# Patient Record
Sex: FEMALE | Race: WHITE | Employment: UNEMPLOYED | URBAN - METROPOLITAN AREA
[De-identification: names, ages, dates, MRNs, and addresses within clinical notes are randomized per-mention and may not be internally consistent; named-entity substitution may affect disease eponyms.]

---

## 2017-10-28 RX ORDER — SERTRALINE HYDROCHLORIDE 100 MG/1
TABLET, FILM COATED ORAL
Qty: 30 TABLET | Refills: 10 | Status: SHIPPED | OUTPATIENT
Start: 2017-10-28 | End: 2018-10-13 | Stop reason: SDUPTHER

## 2017-10-31 RX ORDER — ACETAMINOPHEN AND CODEINE PHOSPHATE 120; 12 MG/5ML; MG/5ML
SOLUTION ORAL
Qty: 28 TABLET | Refills: 10 | Status: SHIPPED | OUTPATIENT
Start: 2017-10-31 | End: 2018-09-18 | Stop reason: ALTCHOICE

## 2018-01-03 ENCOUNTER — TELEPHONE (OUTPATIENT)
Dept: OBGYN CLINIC | Age: 38
End: 2018-01-03

## 2018-01-03 RX ORDER — NORETHINDRONE ACETATE AND ETHINYL ESTRADIOL 1MG-20(21)
1 KIT ORAL DAILY
Qty: 1 PACKET | Refills: 11 | Status: SHIPPED | OUTPATIENT
Start: 2018-01-03 | End: 2018-12-20

## 2018-01-03 NOTE — TELEPHONE ENCOUNTER
Patient has completely stopped nursing twins 2--3 months ago so is now having breakthrough on Micronor. Would like to change to regular strength OCP.        Babita Barragan

## 2018-08-14 ENCOUNTER — TELEPHONE (OUTPATIENT)
Dept: FAMILY MEDICINE CLINIC | Age: 38
End: 2018-08-14
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
## 2018-09-18 ENCOUNTER — OFFICE VISIT (OUTPATIENT)
Dept: FAMILY MEDICINE CLINIC | Age: 38
End: 2018-09-18
Payer: COMMERCIAL

## 2018-09-18 VITALS
SYSTOLIC BLOOD PRESSURE: 118 MMHG | BODY MASS INDEX: 28.32 KG/M2 | HEIGHT: 61 IN | HEART RATE: 87 BPM | OXYGEN SATURATION: 98 % | WEIGHT: 150 LBS | DIASTOLIC BLOOD PRESSURE: 74 MMHG

## 2018-09-18 DIAGNOSIS — Z51.81 MEDICATION MONITORING ENCOUNTER: ICD-10-CM

## 2018-09-18 DIAGNOSIS — Z63.72 ALCOHOLIC SPOUSE: ICD-10-CM

## 2018-09-18 DIAGNOSIS — R53.83 FATIGUE, UNSPECIFIED TYPE: ICD-10-CM

## 2018-09-18 DIAGNOSIS — Z11.3 SCREEN FOR STD (SEXUALLY TRANSMITTED DISEASE): ICD-10-CM

## 2018-09-18 DIAGNOSIS — Z13.220 SCREENING FOR LIPID DISORDERS: ICD-10-CM

## 2018-09-18 DIAGNOSIS — F41.8 SITUATIONAL ANXIETY: Primary | ICD-10-CM

## 2018-09-18 PROCEDURE — 99204 OFFICE O/P NEW MOD 45 MIN: CPT | Performed by: FAMILY MEDICINE

## 2018-09-18 RX ORDER — ETONOGESTREL AND ETHINYL ESTRADIOL 11.7; 2.7 MG/1; MG/1
1 INSERT, EXTENDED RELEASE VAGINAL
Qty: 3 EACH | Refills: 3 | Status: SHIPPED | OUTPATIENT
Start: 2018-09-18 | End: 2019-01-22 | Stop reason: ALTCHOICE

## 2018-09-18 ASSESSMENT — ENCOUNTER SYMPTOMS
EYE PAIN: 0
BLOOD IN STOOL: 0
SINUS PRESSURE: 0
WHEEZING: 0
ABDOMINAL PAIN: 0
VOMITING: 0
PHOTOPHOBIA: 0
CHEST TIGHTNESS: 0
SORE THROAT: 0
DIARRHEA: 0
FACIAL SWELLING: 0
ABDOMINAL DISTENTION: 0
COLOR CHANGE: 0
NAUSEA: 0
EYE REDNESS: 0
SHORTNESS OF BREATH: 0
CONSTIPATION: 0
EYE ITCHING: 0
VOICE CHANGE: 0
COUGH: 0
EYE DISCHARGE: 0
RHINORRHEA: 0
BACK PAIN: 0

## 2018-09-18 ASSESSMENT — PATIENT HEALTH QUESTIONNAIRE - PHQ9
2. FEELING DOWN, DEPRESSED OR HOPELESS: 0
SUM OF ALL RESPONSES TO PHQ QUESTIONS 1-9: 1
SUM OF ALL RESPONSES TO PHQ QUESTIONS 1-9: 1
1. LITTLE INTEREST OR PLEASURE IN DOING THINGS: 1
SUM OF ALL RESPONSES TO PHQ9 QUESTIONS 1 & 2: 1
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
## 2018-12-20 ENCOUNTER — HOSPITAL ENCOUNTER (OUTPATIENT)
Age: 38
Setting detail: SPECIMEN
Discharge: HOME | End: 2018-12-20
Payer: COMMERCIAL

## 2018-12-20 ENCOUNTER — OFFICE VISIT (OUTPATIENT)
Dept: OBGYN CLINIC | Age: 38
End: 2018-12-20
Payer: COMMERCIAL

## 2018-12-20 VITALS — BODY MASS INDEX: 29.66 KG/M2 | SYSTOLIC BLOOD PRESSURE: 128 MMHG | DIASTOLIC BLOOD PRESSURE: 72 MMHG | WEIGHT: 157 LBS

## 2018-12-20 DIAGNOSIS — N89.8 VAGINAL DISCHARGE: ICD-10-CM

## 2018-12-20 DIAGNOSIS — Z30.09 ENCOUNTER FOR COUNSELING REGARDING CONTRACEPTION: ICD-10-CM

## 2018-12-20 DIAGNOSIS — N89.8 VAGINAL DISCHARGE: Primary | ICD-10-CM

## 2018-12-20 LAB
DIRECT EXAM: ABNORMAL
Lab: ABNORMAL
SPECIMEN DESCRIPTION: ABNORMAL
STATUS: ABNORMAL

## 2018-12-20 PROCEDURE — 99213 OFFICE O/P EST LOW 20 MIN: CPT | Performed by: NURSE PRACTITIONER

## 2018-12-20 RX ORDER — NORETHINDRONE ACETATE AND ETHINYL ESTRADIOL 1MG-20(21)
1 KIT ORAL DAILY
Qty: 1 PACKET | Refills: 1 | Status: SHIPPED | OUTPATIENT
Start: 2018-12-20 | End: 2019-01-22 | Stop reason: SDUPTHER

## 2018-12-20 ASSESSMENT — ENCOUNTER SYMPTOMS
NAUSEA: 0
ABDOMINAL PAIN: 0
VOMITING: 0
SHORTNESS OF BREATH: 0
WHEEZING: 0

## 2018-12-20 NOTE — PATIENT INSTRUCTIONS
Patient Education        Vaginitis: Care Instructions  Your Care Instructions    Vaginitis is soreness or infection of the vagina. This common problem can cause itching and burning. And it can cause a change in vaginal discharge. Sometimes it can cause pain during sex. Vaginitis may be caused by bacteria, yeast, or other germs. Some infections that cause it are caught from a sexual partner. Bath products, spermicides, and douches can irritate the vagina too. Some women have this problem during and after menopause. A drop in estrogen levels during this time can cause dryness, soreness, and pain during sex. Your doctor can give you medicine to treat an infection. And home care may help you feel better. For certain types of infections, your sex partner must be treated too. Follow-up care is a key part of your treatment and safety. Be sure to make and go to all appointments, and call your doctor if you are having problems. It's also a good idea to know your test results and keep a list of the medicines you take. How can you care for yourself at home? · If your doctor prescribed antibiotics, take them as directed. Do not stop taking them just because you feel better. You need to take the full course of antibiotics. · Take your medicines exactly as prescribed. Call your doctor if you think you are having a problem with your medicine. · Do not eat or drink anything that has alcohol if you are taking metronidazole (Flagyl). · If you have a yeast infection, use over-the-counter products as your doctor tells you to. Or take medicine your doctor prescribes exactly as directed. · Wash your vaginal area daily with water. You also can use a mild, unscented soap if you want. · Do not use scented bath products. And do not use vaginal sprays or douches. · Put a washcloth soaked in cool water on the area to relieve itching. Or you can take cool baths.   · If you have dryness because of menopause, use estrogen cream or pills STI, use a condom to protect against disease. When should you call for help? Call your doctor now or seek immediate medical care if:    · You have pain in your belly or pelvis.     · You have severe vaginal bleeding. This means that you are soaking through your usual pads or tampons each hour for 2 or more hours.     · You have vaginal discharge that smells bad.     · You have a fever.    Watch closely for changes in your health, and be sure to contact your doctor if you have any problems. Where can you learn more? Go to https://chlisa.health"[employer]. org and sign in to your [employer] account. Enter U282 in the youwho box to learn more about \"Intrauterine Device (IUD) for Birth Control: Care Instructions. \"     If you do not have an account, please click on the \"Sign Up Now\" link. Current as of: November 21, 2017  Content Version: 11.8  © 6089-8970 [employer]. Care instructions adapted under license by [facility] ([facility]). If you have questions about a medical condition or this instruction, always ask your healthcare professional. [place] [address] any warranty or liability for your use of this information. Combination Birth Control Pills: Care Instructions  Your Care Instructions    Combination birth control pills are used to prevent pregnancy. They give you a regular dose of the hormones estrogen and progestin. You take a hormone pill every day to prevent pregnancy. Birth control pills come in packs. The most common type has 3 weeks of hormone pills. Some packs have sugar pills (they do not contain any hormones) for the fourth week. During that fourth no-hormone week, you have your period. After the fourth week (28 days), you start a new pack. Some birth control pills are packaged in different ways. For example, some have hormone pills for the fourth week instead of sugar pills.  Taking hormones for the entire month causes you to not have periods or to

## 2018-12-21 ENCOUNTER — TELEPHONE (OUTPATIENT)
Dept: OBGYN CLINIC | Age: 38
End: 2018-12-21

## 2018-12-21 DIAGNOSIS — B96.89 BACTERIAL VAGINOSIS: Primary | ICD-10-CM

## 2018-12-21 DIAGNOSIS — N76.0 BACTERIAL VAGINOSIS: Primary | ICD-10-CM

## 2018-12-21 LAB
C TRACH DNA GENITAL QL NAA+PROBE: NEGATIVE
N. GONORRHOEAE DNA: NEGATIVE

## 2018-12-21 RX ORDER — METRONIDAZOLE 500 MG/1
500 TABLET ORAL 2 TIMES DAILY
Qty: 14 TABLET | Refills: 0 | Status: SHIPPED | OUTPATIENT
Start: 2018-12-21 | End: 2018-12-28

## 2019-01-22 ENCOUNTER — HOSPITAL ENCOUNTER (OUTPATIENT)
Age: 39
Setting detail: SPECIMEN
Discharge: HOME | End: 2019-01-22
Payer: COMMERCIAL

## 2019-01-22 ENCOUNTER — OFFICE VISIT (OUTPATIENT)
Dept: OBGYN CLINIC | Age: 39
End: 2019-01-22
Payer: COMMERCIAL

## 2019-01-22 VITALS — BODY MASS INDEX: 29.66 KG/M2 | HEIGHT: 61 IN | SYSTOLIC BLOOD PRESSURE: 128 MMHG | DIASTOLIC BLOOD PRESSURE: 60 MMHG

## 2019-01-22 DIAGNOSIS — Z01.419 ENCOUNTER FOR ANNUAL ROUTINE GYNECOLOGICAL EXAMINATION: Primary | ICD-10-CM

## 2019-01-22 PROCEDURE — 99395 PREV VISIT EST AGE 18-39: CPT | Performed by: NURSE PRACTITIONER

## 2019-01-22 RX ORDER — NORETHINDRONE ACETATE AND ETHINYL ESTRADIOL 1MG-20(21)
1 KIT ORAL DAILY
Qty: 1 PACKET | Refills: 11 | Status: SHIPPED | OUTPATIENT
Start: 2019-01-22 | End: 2020-01-06

## 2019-01-22 ASSESSMENT — ENCOUNTER SYMPTOMS
NAUSEA: 0
ABDOMINAL PAIN: 0
COUGH: 0
CONSTIPATION: 0
SHORTNESS OF BREATH: 0
RHINORRHEA: 0
BACK PAIN: 0
DIARRHEA: 0
VOMITING: 0

## 2019-01-25 LAB
HPV SAMPLE: ABNORMAL
HPV SOURCE: ABNORMAL
HPV, GENOTYPE 16: NOT DETECTED
HPV, GENOTYPE 18: NOT DETECTED
HPV, HIGH RISK OTHER: DETECTED
HPV, INTERPRETATION: ABNORMAL

## 2019-02-06 PROBLEM — R87.610 ASCUS WITH POSITIVE HIGH RISK HPV CERVICAL: Status: ACTIVE | Noted: 2019-02-06

## 2019-02-06 PROBLEM — R87.810 ASCUS WITH POSITIVE HIGH RISK HPV CERVICAL: Status: ACTIVE | Noted: 2019-02-06

## 2019-02-06 LAB — CYTOLOGY REPORT: NORMAL

## 2019-03-04 ENCOUNTER — PROCEDURE VISIT (OUTPATIENT)
Dept: OBGYN CLINIC | Age: 39
End: 2019-03-04
Payer: COMMERCIAL

## 2019-03-04 ENCOUNTER — HOSPITAL ENCOUNTER (OUTPATIENT)
Age: 39
Setting detail: SPECIMEN
Discharge: HOME | End: 2019-03-04
Payer: COMMERCIAL

## 2019-03-04 VITALS — SYSTOLIC BLOOD PRESSURE: 120 MMHG | HEIGHT: 61 IN | DIASTOLIC BLOOD PRESSURE: 60 MMHG | BODY MASS INDEX: 29.66 KG/M2

## 2019-03-04 DIAGNOSIS — N89.8 VAGINAL DISCHARGE: ICD-10-CM

## 2019-03-04 DIAGNOSIS — R87.610 ASCUS WITH POSITIVE HIGH RISK HPV CERVICAL: Primary | ICD-10-CM

## 2019-03-04 DIAGNOSIS — R87.810 ASCUS WITH POSITIVE HIGH RISK HPV CERVICAL: Primary | ICD-10-CM

## 2019-03-04 DIAGNOSIS — Z01.812 PRE-PROCEDURE LAB EXAM: ICD-10-CM

## 2019-03-04 LAB
CONTROL: PRESENT
PREGNANCY TEST URINE, POC: NEGATIVE

## 2019-03-04 PROCEDURE — 57454 BX/CURETT OF CERVIX W/SCOPE: CPT | Performed by: OBSTETRICS & GYNECOLOGY

## 2019-03-04 PROCEDURE — 81025 URINE PREGNANCY TEST: CPT | Performed by: OBSTETRICS & GYNECOLOGY

## 2019-03-05 DIAGNOSIS — N89.8 VAGINAL DISCHARGE: ICD-10-CM

## 2019-03-05 LAB
DIRECT EXAM: NORMAL
Lab: NORMAL
SPECIMEN DESCRIPTION: NORMAL

## 2019-03-06 LAB
C TRACH DNA GENITAL QL NAA+PROBE: NEGATIVE
N. GONORRHOEAE DNA: NEGATIVE
SPECIMEN DESCRIPTION: NORMAL

## 2019-03-08 LAB — SURGICAL PATHOLOGY REPORT: NORMAL

## 2019-03-11 PROBLEM — N87.0 DYSPLASIA OF CERVIX, LOW GRADE (CIN 1): Status: ACTIVE | Noted: 2019-03-11

## 2019-03-12 ENCOUNTER — TELEPHONE (OUTPATIENT)
Dept: OBGYN CLINIC | Age: 39
End: 2019-03-12

## 2019-03-27 ENCOUNTER — HOSPITAL ENCOUNTER (EMERGENCY)
Age: 39
Discharge: PSYCHIATRIC OR SUBSTANCE USE FACILITY | End: 2019-03-28
Attending: EMERGENCY MEDICINE
Payer: COMMERCIAL

## 2019-03-27 VITALS
OXYGEN SATURATION: 98 % | TEMPERATURE: 97.3 F | RESPIRATION RATE: 16 BRPM | HEART RATE: 87 BPM | DIASTOLIC BLOOD PRESSURE: 81 MMHG | SYSTOLIC BLOOD PRESSURE: 126 MMHG

## 2019-03-27 DIAGNOSIS — R45.851 SUICIDAL IDEATIONS: Primary | ICD-10-CM

## 2019-03-27 DIAGNOSIS — F33.9 EPISODE OF RECURRENT MAJOR DEPRESSIVE DISORDER, UNSPECIFIED DEPRESSION EPISODE SEVERITY (HCC): ICD-10-CM

## 2019-03-27 PROCEDURE — 99285 EMERGENCY DEPT VISIT HI MDM: CPT

## 2019-03-27 RX ORDER — BENZTROPINE MESYLATE 1 MG/ML
2 INJECTION INTRAMUSCULAR; INTRAVENOUS 2 TIMES DAILY PRN
Status: CANCELLED | OUTPATIENT
Start: 2019-03-27

## 2019-03-27 RX ORDER — NICOTINE 21 MG/24HR
1 PATCH, TRANSDERMAL 24 HOURS TRANSDERMAL DAILY
Status: CANCELLED | OUTPATIENT
Start: 2019-03-28

## 2019-03-27 RX ORDER — MAGNESIUM HYDROXIDE/ALUMINUM HYDROXICE/SIMETHICONE 120; 1200; 1200 MG/30ML; MG/30ML; MG/30ML
30 SUSPENSION ORAL EVERY 6 HOURS PRN
Status: CANCELLED | OUTPATIENT
Start: 2019-03-27

## 2019-03-27 RX ORDER — TRAZODONE HYDROCHLORIDE 50 MG/1
50 TABLET ORAL NIGHTLY PRN
Status: CANCELLED | OUTPATIENT
Start: 2019-03-28

## 2019-03-27 RX ORDER — HYDROXYZINE PAMOATE 50 MG/1
25 CAPSULE ORAL 3 TIMES DAILY PRN
Status: CANCELLED | OUTPATIENT
Start: 2019-03-27

## 2019-03-27 RX ORDER — ACETAMINOPHEN 325 MG/1
650 TABLET ORAL EVERY 4 HOURS PRN
Status: CANCELLED | OUTPATIENT
Start: 2019-03-27
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
## 2019-03-28 ENCOUNTER — HOSPITAL ENCOUNTER (INPATIENT)
Age: 39
LOS: 3 days | Discharge: HOME | DRG: 885 | End: 2019-03-31
Attending: PSYCHIATRY & NEUROLOGY | Admitting: PSYCHIATRY & NEUROLOGY
Payer: COMMERCIAL

## 2019-03-28 PROBLEM — R45.851 DEPRESSION WITH SUICIDAL IDEATION: Status: ACTIVE | Noted: 2019-03-28

## 2019-03-28 PROBLEM — F32.A DEPRESSION WITH SUICIDAL IDEATION: Status: ACTIVE | Noted: 2019-03-28

## 2019-03-28 PROBLEM — F33.9 MAJOR DEPRESSION, RECURRENT (HCC): Status: ACTIVE | Noted: 2019-03-28

## 2019-03-28 PROCEDURE — 6370000000 HC RX 637 (ALT 250 FOR IP): Performed by: NURSE PRACTITIONER

## 2019-03-28 PROCEDURE — 90792 PSYCH DIAG EVAL W/MED SRVCS: CPT | Performed by: NURSE PRACTITIONER

## 2019-03-28 PROCEDURE — 1240000000 HC EMOTIONAL WELLNESS R&B

## 2019-03-28 RX ORDER — AMOXICILLIN 500 MG/1
500 CAPSULE ORAL EVERY 12 HOURS SCHEDULED
Status: DISCONTINUED | OUTPATIENT
Start: 2019-03-28 | End: 2019-03-31 | Stop reason: HOSPADM

## 2019-03-28 RX ORDER — TRAZODONE HYDROCHLORIDE 50 MG/1
50 TABLET ORAL NIGHTLY PRN
Status: DISCONTINUED | OUTPATIENT
Start: 2019-03-28 | End: 2019-03-31 | Stop reason: HOSPADM

## 2019-03-28 RX ORDER — BENZTROPINE MESYLATE 1 MG/ML
2 INJECTION INTRAMUSCULAR; INTRAVENOUS 2 TIMES DAILY PRN
Status: DISCONTINUED | OUTPATIENT
Start: 2019-03-28 | End: 2019-03-31 | Stop reason: HOSPADM

## 2019-03-28 RX ORDER — CITALOPRAM 20 MG/1
20 TABLET ORAL DAILY
Status: DISCONTINUED | OUTPATIENT
Start: 2019-03-28 | End: 2019-03-31 | Stop reason: HOSPADM

## 2019-03-28 RX ORDER — NICOTINE 21 MG/24HR
1 PATCH, TRANSDERMAL 24 HOURS TRANSDERMAL DAILY
Status: DISCONTINUED | OUTPATIENT
Start: 2019-03-28 | End: 2019-03-31 | Stop reason: HOSPADM

## 2019-03-28 RX ORDER — MAGNESIUM HYDROXIDE/ALUMINUM HYDROXICE/SIMETHICONE 120; 1200; 1200 MG/30ML; MG/30ML; MG/30ML
30 SUSPENSION ORAL EVERY 6 HOURS PRN
Status: DISCONTINUED | OUTPATIENT
Start: 2019-03-28 | End: 2019-03-31 | Stop reason: HOSPADM

## 2019-03-28 RX ORDER — HYDROXYZINE HYDROCHLORIDE 25 MG/1
25 TABLET, FILM COATED ORAL 3 TIMES DAILY PRN
Status: DISCONTINUED | OUTPATIENT
Start: 2019-03-28 | End: 2019-03-31 | Stop reason: HOSPADM

## 2019-03-28 RX ORDER — ACETAMINOPHEN 325 MG/1
650 TABLET ORAL EVERY 4 HOURS PRN
Status: DISCONTINUED | OUTPATIENT
Start: 2019-03-28 | End: 2019-03-31 | Stop reason: HOSPADM

## 2019-03-28 RX ADMIN — HYDROXYZINE HYDROCHLORIDE 25 MG: 25 TABLET, FILM COATED ORAL at 01:51

## 2019-03-28 RX ADMIN — HYDROXYZINE HYDROCHLORIDE 25 MG: 25 TABLET, FILM COATED ORAL at 13:46

## 2019-03-28 RX ADMIN — TRAZODONE HYDROCHLORIDE 50 MG: 50 TABLET ORAL at 21:36

## 2019-03-28 RX ADMIN — HYDROXYZINE HYDROCHLORIDE 25 MG: 25 TABLET, FILM COATED ORAL at 21:36

## 2019-03-28 RX ADMIN — CITALOPRAM HYDROBROMIDE 20 MG: 20 TABLET ORAL at 12:42

## 2019-03-28 RX ADMIN — AMOXICILLIN 500 MG: 500 CAPSULE ORAL at 21:36

## 2019-03-28 ASSESSMENT — PAIN - FUNCTIONAL ASSESSMENT: PAIN_FUNCTIONAL_ASSESSMENT: 0-10

## 2019-03-28 ASSESSMENT — SLEEP AND FATIGUE QUESTIONNAIRES
DIFFICULTY FALLING ASLEEP: YES
SLEEP PATTERN: DIFFICULTY FALLING ASLEEP;DISTURBED/INTERRUPTED SLEEP;RESTLESSNESS
AVERAGE NUMBER OF SLEEP HOURS: 3
DIFFICULTY ARISING: NO
DO YOU USE A SLEEP AID: NO
DIFFICULTY STAYING ASLEEP: YES
DO YOU HAVE DIFFICULTY SLEEPING: YES
RESTFUL SLEEP: NO

## 2019-03-28 ASSESSMENT — ENCOUNTER SYMPTOMS
EYE PAIN: 0
ALLERGIC/IMMUNOLOGIC NEGATIVE: 1
EYE DISCHARGE: 0
EYE REDNESS: 0

## 2019-03-28 ASSESSMENT — LIFESTYLE VARIABLES
HISTORY_ALCOHOL_USE: NO
HISTORY_ALCOHOL_USE: NO

## 2019-03-28 ASSESSMENT — PATIENT HEALTH QUESTIONNAIRE - PHQ9: SUM OF ALL RESPONSES TO PHQ QUESTIONS 1-9: 27
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
## 2019-03-28 NOTE — GROUP NOTE
Group Therapy Note    Date: March 28    Group Start Time: 1100  Group End Time: 1130  Group Topic: Recreational    ST[facility] LASHELL Gomez, CTRS      Group Therapy Note    Pt did not attend Therapeutic Recreation at 1100 d/t resting in room despite staff invitation to attend.
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
## 2019-03-28 NOTE — GROUP NOTE
Group Therapy Note    Date: March 28    Group Start Time: 1430  Group End Time: 1500  Group Topic: Psychoeducation    [facility]    Emilio Ortiz, CTRS      Group Therapy Note    Pt did not attend Therapeutic Recreation at 1430 d/t resting in room despite staff invitation to attend.

## 2019-03-28 NOTE — ED NOTES
Pt resting in bed, no needs at this time. Will continue to monitor.       Aj Houston RN  03/27/19 8041

## 2019-03-28 NOTE — ED NOTES
[] [place]    [] [address]    [x]  [address] ASSESSMENT      Y  N     [] [x] In the past two weeks have you had thoughts of hurting yourself in any way? Pt denying suicidal thoughts, however family members have shown text messages confirming pt has stated thoughts of SI     [] [x] In the past two weeks have you had thoughts that you would be better off dead? [] [x] Have you made a suicide attempt in the past two months? [] [x] Do you have a plan for hurting yourself or suicide? [] [x] Presence of hallucinations/voices related to hurting himself or herself or someone else. SUICIDE/SECURITY WATCH PRECAUTION CHECKLIST     Orders    [x]  Suicide/Security Watch Precautions initiated as checked below:   3/27/19 9:32 PM BH31/BH31C    [x] Notified physician:  Suzanne Medina MD  3/27/19 9:32 PM    [x] Orders obtained as appropriate:     [x] 1:1 Observer     [] Psych Consult     [] Psych Consult    Name:  Date:  Time:    [x] 1:1 Observer, Notified by:  Cecily Carrasquillo    Contact Nurse Supervisor    [x] Remove all personal clothes from room and place in snap/paper gown/pants. Slipper only    [x] Remove all personal belongings from room and secured away from patient. Documentation    [x] Initiate Suicide/Security Watch Precaution Flow Sheet    [x] Initiate individualized Care Plan/Problem    [x] Document why precautions initiated on flow sheet (Initiate Nursing Care Plan/Problem)    [x] 1:1 Observer in place; instructions provided. Suicide precautions require observer be within arms length. [x] Nurse-Observer Communication Hand-off initiated by RN, reviewed with Observer. Subsequently used as Hand Off between Observers. [x] Initiate every 15 minute observations per observer as delegated by the RN.     [x] Initiate RN assessment and documentation    Environmental Scan  Search Criteria and Process: OPTIONAL, see Search Policy    [] Reason for search:    [] Nursing in presence of second person to search patient    [] Patient notified of reason for body assessment and belongings search:     Persons present during search:   Results of search and disposition:       Searchers Name: Security      These items or items similar should be removed from the room:   [] Chairs   [x] Telephone   [x] Trash cans and liners   [x] Plastic utensils (order Patient Safety tray)   [] Empty or remove Sharps containers   [x] All personal clothing/belongings removed   [x] All unnecessary lead wires, electrical cords, draw cords, etc.   [x] Flowers and plants   [x] Double check for lighters, matches, razors, any glass items etc that can be used as weapons. Person completing Checklist: Ebenezer Lopez       GENERAL INFORMATION     Y  N     [x] [] Has the patient been informed that they are on a watch and what that means? [x] [] Can the patient get out of Bed without nursing assistance? [x] [] Can the patient use the restroom without nursing assistance? [x] [] Can the patient walk the halls to [place] their legs? \"   [] [x] Does the patient have metal utensils? [x] [] Have the patient's belongings been placed out of control of the patient? [x] [] Have the patient and his/her belongings been checked for contraband? [x] [] Is the patient under any visitor restrictions? If Yes, explain: in [facility]    [] [x] Is the patient under an alias? Alias Name:   Authorized visitors (no more than two are to be on the list)   Name/Relationship:   Name/Relationship:    Name of Staff member that you  Received this information from?: security     General Description:    Phuong Mercado BH31/BH31C female 41 y.o. Admission weight:      Race: [x]  [] Black  []   []   [] [place] [] Other  Facial Hair:  [] Yes  [x] No  If yes, please describe: Identifying Marks (i.e. Visible tattoos, scars, etc... ):     NURSING CARE PLAN    Nursing Diagnosis: Risk of Self Directed Harm  [] Actual  [x] Potential  Date Started: 3/27/19      Etiological Factors: (related to)  [] Expressed or implied suicidal ideation/behavior  [x] Depression  [] Suicide attempt      [x] Low self-esteem  [] Hallucinations      [x] Feeling of Hopelessness  [] Substance abuse or withdrawal    [] Dysfunctional family  [] Major traumatic event, eg., divorce, etc   [x] Excessive stress/anxiety    3/27/19    Expected Outcomes    Patient will:   [x] Patient will remain safe for the duration of their stay   [x] Patient's environment will be safe, eg. Free of potential suicide weapons   [] Verbalize Recovery from suicidal episode and improvement in self-worth   [x] Discuss feeling that precipitated suicide attempt/thoughts/behavior   [] Will describe available resources for crisis prevention and management   [] Will verbalize positive coping skills     Nursing Intervention   [x] Assessment and Observations hourly   [x] Suicide Precautions implemented with patient, should be 1:1 observation   [x] Document observation [id number] and RN assessment hourly   [] Consult physician for:    [] Psychiatric consult    [] Pharmacological therapy    [] Other:    [x] Patient search completed by security   [x] Initiated appropriate safety protocols by removing from the patient's environment anything that could be used to inflict self injury, eg. Order safe tray, snap gown, etc   [x] Maintain open, warm, caring, non-judgmental attitude/manner towards patient   [] Discuss advantages and disadvantages of existing coping methods/skills   [x] Assist and educate patient with identifying present strengths and coping skills   [x] Keep patient informed regarding plan of care and provide clear concise explanations. Provide the patient/family education information as well as telephone numbers and other information about crisis centers, hot lines, and counselors.     Discharge Planning:   [] Referral  [] Groups [] Health agencies  [] Other:          Majo Alejandra CRYSTAL  03/27/19 2567

## 2019-03-28 NOTE — ED NOTES
Pt resting in bed with eyes closed, no needs at this time. Will continue to monitor.       Zachariah Lobato RN  03/27/19 0771
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
## 2019-03-28 NOTE — ED NOTES
Pt resting in bed comfortably, tearful- tissues provided. Pt wanting an update, dr Walter Moses notified.       Cecily Carrasquillo RN  03/27/19 9079       Cecily Carrasquillo RN  03/27/19 7037
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
## 2019-03-28 NOTE — GROUP NOTE
Group Note  Date: 3/28/2019  Time: 8415-2940  Group: [facility] [place] [facility]    Group Therapy Note    Patient did not attend 1600 group despite multiple attempts to encourage participation.

## 2019-03-28 NOTE — ED NOTES
..Provisional Diagnosis:   Depression     Psychosocial and Contextual Factors:  Father Recent hung himself. Recent affair. C-SSRS Summary:      Patient: X  Family:   Agency: N/A    Substance Abuse:     Present Suicidal Behavior:      Verbal: Yes    Attempt: Denied     Past Suicidal Behavior:     Verbal: Denied     Attempt: Denied       Self-Injurious/Self-Mutilation: Denied     Trauma Identified: Denied       Protective Factors:    Four Children     Risk Factors:    Suicidal thoughts. Not compliant with medications. Clinical Summary:    The patient is a 41year old female that was brought into the ER today due to feeling suicidal by her . Per the patient, the patient's friend called the [facility] for a safety check and was escorted to the ER for evaluation. The patient presents tearful, very depressed, hopeless, helpless. The patient denied ever feeling suicidal. Per the patient, she has been feeling increased depressed since November when her father hung himself. Patient also reports stressed related to her marriage because she cheated on him. The patient reports that she is prescribed 100mg of Zoloft. The patient reports that she is compliant with her Zoloft. SW met with the patient's  after she gave verbal permission to talk to him. Patient's  showed SW text messages from today that the patient reports, \"I don't want to live anymore. \"  \"I am going to kill myself. \" The  also reports that the patient has not been taking her Zoloft in several days. Level of Care Disposition:    ~2041 On call provider paged for LOC.       Katty Hunter, [facility]  03/27/19 2042

## 2019-03-28 NOTE — ED NOTES
Patient assigned to room 210-1 at the [facility]. [facility] cannot accept the patient till . [address] [place] [address] Rd, Deyanira Orourke 54  03/27/19 7942
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
## 2019-03-28 NOTE — PLAN OF CARE
[address] [facility]  Initial Interdisciplinary Treatment Plan NO      Original treatment plan Date & Time: 3/28/2019   0834    Admission Type:  Admission Type:  Involuntary(from [facility] ER, NOT SIGNED IN)    Reason for admission:   Reason for Admission: patient admits to feeling depressed and anxious at this time, sent  text messages that she wanted to end her life    Estimated Length of Stay:  5-7days  Estimated Discharge Date: to be determined by physician    PATIENT STRENGTHS:  Patient Strengths:Strengths: Communication, No significant Physical Illness  Patient Strengths and Limitations:Limitations: Hopeless about future, Difficult relationships / poor social skills, General negative or hopeless attitude about future/recovery  Addictive Behavior: Addictive Behavior  In the past 3 months, have you felt or has someone told you that you have a problem with:  : None  Do you have a history of Chemical Use?: No  Do you have a history of Alcohol Use?: No  Do you have a history of Street Drug Abuse?: Yes  Histroy of Prescripton Drug Abuse?: No  Medical Problems:  Past Medical History:   Diagnosis Date    Anxiety     Asthma     AS A CHILD    Depression     Gestational diabetes mellitus      Status EXAM:Status and Exam  Normal: No  Facial Expression: Hostile, Worried, Flat, Avoids Gaze(swollen eyes from crying)  Level of Consciousness: Alert  Mood:Normal: No  Mood: Anxious, Ashamed/humiliated, Angry, Helpless, Worthless, low self-esteem  Motor Activity:Normal: No  Motor Activity: Increased  Interview Behavior: Irritable, Cooperative  Preception: [place] to Person, [place] to Time, [place] to Place  Attention:Normal: No  Attention: Unable to Concentrate  Thought Processes: Flt.of Ideas, Tangential  Thought Content:Normal: No  Thought Content: Paranoia, Preoccupations  Hallucinations: None  Delusions: Yes  Delusions: Persecution(thinks everyone is against her)  Memory:Normal: No  Memory: Poor Recent  Insight and Judgment: No  Insight and Judgment: Poor Judgment, Poor Insight, Unrealistic  Present Suicidal Ideation: No  Present Homicidal Ideation: No    EDUCATION:   Learner Progress Toward Treatment Goals: reviewed group plans and strategies for care    Method:group therapy, medication compliance, individualized assessments and care planning    Outcome: needs reinforcement    PATIENT GOALS: to be discussed with patient within 72 hours    PLAN/TREATMENT RECOMMENDATIONS:     continue group therapy , medications compliance, goal setting, individualized assessments and care, continue to monitor pt on unit      SHORT-TERM GOALS:   Time frame for Short-Term Goals: 5-7 days    LONG-TERM GOALS:  Time frame for Long-Term Goals: 6 months  Members Present in Team Meeting: See [address] Mary Chang

## 2019-03-28 NOTE — GROUP NOTE
Group Therapy Note    Date: March 28    Group Start Time: 0900  Group End Time: 0930  Group Topic: [facility]      Group Therapy Note    Pt did not attend Community Meeting at 0900 d/t resting in room despite staff invitation to attend.

## 2019-03-29 PROCEDURE — 6370000000 HC RX 637 (ALT 250 FOR IP): Performed by: NURSE PRACTITIONER

## 2019-03-29 PROCEDURE — 1240000000 HC EMOTIONAL WELLNESS R&B

## 2019-03-29 PROCEDURE — 99232 SBSQ HOSP IP/OBS MODERATE 35: CPT | Performed by: PSYCHIATRY & NEUROLOGY

## 2019-03-29 RX ADMIN — TRAZODONE HYDROCHLORIDE 50 MG: 50 TABLET ORAL at 20:47

## 2019-03-29 RX ADMIN — AMOXICILLIN 500 MG: 500 CAPSULE ORAL at 08:29

## 2019-03-29 RX ADMIN — HYDROXYZINE HYDROCHLORIDE 25 MG: 25 TABLET, FILM COATED ORAL at 20:47

## 2019-03-29 RX ADMIN — HYDROXYZINE HYDROCHLORIDE 25 MG: 25 TABLET, FILM COATED ORAL at 08:29

## 2019-03-29 RX ADMIN — AMOXICILLIN 500 MG: 500 CAPSULE ORAL at 20:47

## 2019-03-29 RX ADMIN — CITALOPRAM HYDROBROMIDE 20 MG: 20 TABLET ORAL at 08:29

## 2019-03-29 ASSESSMENT — PAIN SCALES - GENERAL
PAINLEVEL_OUTOF10: 0
PAINLEVEL_OUTOF10: 0

## 2019-03-29 NOTE — CARE COORDINATION
Sherr sat with pt and located a private therapist and Nurse Practitioner, that would accept pt's private insurance, for follow up appointments.  was able to link pt to [facility] for therapy, TC on file and CELINA Sewell CNP, for medication management, TC on file.

## 2019-03-29 NOTE — GROUP NOTE
Group Therapy Note    Date: March 29    Group Start Time: 1100  Group End Time: 1130  Group Topic: Recreational    STCZ LASHELL Tolliver, CTRS      Group Therapy Note    Pt did not attend Therapeutic Recreation at 1100 d/t resting in room despite staff invitation to attend.

## 2019-03-29 NOTE — PLAN OF CARE
PSYCHOEDUCATION GROUP NOTE    Date: 3/29/19  Start Time: 1430  End Time: 1510    Number Participants in Group:  5/13    Goal:  Patient will demonstrate increased interpersonal interaction   Topic: Problem Solving/Leisure Awareness/Socialization    Discipline Responsible:   OT  AT  [facility]. x RT MHP Other       Participation Level:     None  Minimal   x Active Listener x Interactive    Monopolizing         Participation Quality:  x Appropriate  Inappropriate   x       Attentive        Intrusive   x       Sharing        Resistant          Supportive        Lethargic       Affective:   x Congruent  Incongruent  Blunted  Flat    Constricted  Anxious  Elated  Angry    Labile  Depressed  Other         Cognitive:  x Alert x Oriented PPTP     Concentration x G  F  P   Attention Span x G  F  P   Short-Term Memory x G  F  P   Long-Term Memory x G  F  P   ProblemSolving/  Decision Making x G  F  P   Ability to Process  Information x G  F  P      Contributing Factors             Delusional             Hallucinating             Flight of Ideas             Other:       Modes of Intervention:  x Education x Support x Exploration    Clarifying x Problem Solving  Confrontation   x Socialization  Limit Setting x Reality Testing   x Activity  Movement x Media    Other:            Response to Learning:  x Able to verbalize current knowledge/experience   x Able to verbalize/acknowledge new learning    Able to retain information    Capable of insight    Able to change behavior   x Progressing to goal    Other:        Comments:

## 2019-03-29 NOTE — PROGRESS NOTES
Department of Psychiatry  Attending Physician Progress Note    Chief Complaint: Major depression, recurrent ([address]     SUBJECTIVE:     The patient was feeling depressed. She reported significant problems with her relationship with her  due to his continued alcoholism and an affair she had for several months as well as financial problems. The patient has been crying at work and unable to focus well. She works as a teacher. The suicidal ideations are less. There was no major side effects. There is no safe alternative other than the hospital treatment at this time. OBJECTIVE    Physical  VITALS:    /85   Pulse 106   Temp 98.8 °F (37.1 °C) (Oral)   Resp 15   Ht 5' 1\" (1.549 m)   Wt 160 lb (72.6 kg)   LMP 02/27/2019   SpO2 97%   BMI 30.23 kg/m²     Mental Status Examination:    Level of consciousness:  Within normal limits  Appearance: Street clothes, seated, with good grooming  Behavior/Motor: No abnormalities noted  Attitude toward examiner:  Cooperative, attentive, good eye contact  Speech:  spontaneous, normal rate, normal volume and well articulated  Mood:  Depressed   Affect:  Mood-congruent, constricted in range. Thought processes:  linear, goal-directed and coherent  Thought content:  denies homicidal ideation  Suicidal Ideation:  less suicidal ideation  Delusions:  no evidence of delusions  Perceptual Disturbance:  No visual or auditory hallucinations. Cognition:  Intact  Memory: grossly intact.   Insight & Judgement: partial       Medications  Current Facility-Administered Medications: acetaminophen (TYLENOL) tablet 650 mg, 650 mg, Oral, Q4H PRN  aluminum & magnesium hydroxide-simethicone (MAALOX) 200-200-20 MG/5ML suspension 30 mL, 30 mL, Oral, Q6H PRN  benztropine mesylate (COGENTIN) injection 2 mg, 2 mg, Intramuscular, BID PRN  hydrOXYzine (ATARAX) tablet 25 mg, 25 mg, Oral, TID PRN  magnesium hydroxide (MILK OF MAGNESIA) 400 MG/5ML suspension 30 mL, 30 mL, Oral, Daily

## 2019-03-29 NOTE — PLAN OF CARE
Problem: Depressive Behavior With or Without Suicide Precautions:  Goal: Ability to disclose and discuss suicidal ideas will improve  Description  Ability to disclose and discuss suicidal ideas will improve  3/28/2019 2301 by Flora Garcias RN  Outcome: Ongoing  Note:   Pt denies suicidal ideations at this time and agrees to seek assistance from staff should thought of self harm arise. Problem: Depressive Behavior With or Without Suicide Precautions:  Goal: Able to verbalize and/or display a decrease in depressive symptoms  Description  Able to verbalize and/or display a decrease in depressive symptoms  3/28/2019 2301 by Flora Garcias RN  Outcome: Ongoing  Note:   Pt reports depression at this time. She reports being depressed for a very long time. Overwhelmed with family issues and other life stressors at this time. She was irritable r/t not being able to eat her dinner earlier. She is stating that the day staff never woke her up to inform her dinner was here. She was focused on this for the first half of the evening. She was able to redirect. She was compliant with all scheduled medications. She reports the plan is to start her on a new medication and she is anxious to see the progress. She did attend hs group this evening and is seen out in the day area watching tv this evening. Safety maintained per unit policy, including q 75S patient safety checks.

## 2019-03-29 NOTE — PLAN OF CARE
PSYCHOEDUCATION GROUP NOTE    Date: 3/29/2019    Start Time: 1330  End Time: 6551    Number Participants in Group:  6/14    Goal:  Patient will demonstrate increased interpersonal interaction   Topic: Gratitude     Discipline Responsible:   OT  AT    Ns. x RT MHP Other       Participation Level:     None  Minimal   x Active Listener x Interactive    Monopolizing         Participation Quality:  x Appropriate  Inappropriate   x       Attentive        Intrusive   x       Sharing        Resistant          Supportive        Lethargic       Affective:   x Congruent  Incongruent  Blunted  Flat    Constricted  Anxious  Elated  Angry    Labile  Depressed  Other         Cognitive:  x Alert x Oriented PPTP     Concentration x G  F  P   Attention Span x G  F  P   Short-Term Memory x G  F  P   Long-Term Memory x G  F  P   ProblemSolving/  Decision Making x G  F  P   Ability to Process  Information x G  F  P      Contributing Factors             Delusional             Hallucinating             Flight of Ideas             Other:       Modes of Intervention:  x Education  Support  Exploration    Clarifying x Problem Solving  Confrontation   x Socialization  Limit Setting x Reality Testing   x Activity  Movement  Media    Other:            Response to Learning:   Able to verbalize current knowledge/experience    Able to verbalize/acknowledge new learning    Able to retain information    Capable of insight    Able to change behavior   x Progressing to goal    Other:        Comments:

## 2019-03-29 NOTE — PLAN OF CARE
Problem: Depressive Behavior With or Without Suicide Precautions:  Goal: Able to verbalize and/or display a decrease in depressive symptoms  Description  Able to verbalize and/or display a decrease in depressive symptoms  3/29/2019 1350 by Warden Tosin LPN  Outcome: Ongoing  Note:   Patient admits to depression and says she has always been depressed. Patient is seen out of her room more today. Patient states she slept well last night and appetite is good. Problem: Depressive Behavior With or Without Suicide Precautions:  Goal: Ability to disclose and discuss suicidal ideas will improve  Description  Ability to disclose and discuss suicidal ideas will improve  Outcome: Ongoing  Note:   Patient denies suicidal ideations. Safety is maintained on the unit and safety checks done every 15 minutes.

## 2019-03-29 NOTE — PLAN OF CARE
[address] [place] [address]  Day 3 Interdisciplinary Treatment Plan NOTE    Review Date & Time: 3/29/2019  1004    Admission Type:   Admission Type:  Involuntary(from [facility] ER, NOT SIGNED IN)    Reason for admission:  Reason for Admission: patient admits to feeling depressed and anxious at this time, sent  text messages that she wanted to end her life  Estimated Length of Stay: 5-7 days  Estimated Discharge Date Update: to be determined by physician    PATIENT STRENGTHS:  Patient Strengths Strengths: Communication, Employment, Medication Compliance, No significant Physical Illness, Social Skills  Patient Strengths and Limitations:Limitations: Hopeless about future, Apathetic / unmotivated, Unrealistic self-view  Addictive Behavior:Addictive Behavior  In the past 3 months, have you felt or has someone told you that you have a problem with:  : None  Do you have a history of Chemical Use?: No  Do you have a history of Alcohol Use?: No  Do you have a history of Street Drug Abuse?: Yes  Histroy of Prescripton Drug Abuse?: No  Medical Problems:  Past Medical History:   Diagnosis Date    Anxiety     Asthma     AS A CHILD    Depression     Gestational diabetes mellitus        Risk:  Fall RiskTotal: 69  Andrew Scale Andrew Scale Score: 22  BVC Total: 1  Change in scores no Changes to plan of Care no    Status EXAM:   Status and Exam  Normal: No  Facial Expression: Worried, Exaggerated  Affect: Congruent  Level of Consciousness: Alert  Mood:Normal: No  Mood: Depressed, Anxious, Labile, Helpless, Worthless, low self-esteem  Motor Activity:Normal: No  Motor Activity: Decreased  Interview Behavior: Cooperative, Irritable  Preception: [place] to Person, [place] to Time, [place] to Place, [place] to Situation  Attention:Normal: No  Attention: Distractible  Thought Processes: Circumstantial  Thought Content:Normal: No  Thought Content: Preoccupations  Hallucinations: None  Delusions: No  Delusions: Persecution  Memory:Normal: No  Memory: Poor Recent  Insight and Judgment: No  Insight and Judgment: Poor Judgment, Poor Insight, Unrealistic  Present Suicidal Ideation: No  Present Homicidal Ideation: No    Daily Assessment Last Entry:   Daily Sleep (WDL): Within Defined Limits         Patient Currently in Pain: Denies  Daily Nutrition (WDL): Within Defined Limits    Patient Monitoring:  Frequency of Checks: 4 times per hour, close    Psychiatric Symptoms:   Depression Symptoms  Depression Symptoms: Impaired concentration, Increased irritability, Feelings of helplessness, Feelings of hopelessess, Feelings of worthlessness  Anxiety Symptoms  Anxiety Symptoms: Generalized  Diana Symptoms  Daina Symptoms: Labile     Psychosis Symptoms  Delusion Type: No problems reported or observed. Suicide Risk CSSR-S:  1) Within the past month, have you wished you were dead or wished you could go to sleep and not wake up? : Yes  2) Have you actually had any thoughts of killing yourself? : No  6) Have you ever done anything, started to do anything, or prepared to do anything to end your life?: No  Change in Result no Change in Plan of care no      EDUCATION:   EDUCATION:   Learner Progress Toward Treatment Goals: Reviewed results and recommendations of this team, Reviewed group plan and strategies, Reviewed signs, symptoms and risk of self harm and violent behavior, Reviewed goals and plan of care    Method:small group, individual verbal education    Outcome:verbalized by patient, but needs reinforcement to obtain goals    PATIENT GOALS:  Short term:  To find motivation, medication management, and working positive coping skills  Long term: Link to private therapist and psychiatrist    PLAN/TREATMENT RECOMMENDATIONS UPDATE: continue with group therapies, increased socialization, continue planning for after discharge goals, continue with medication compliance    SHORT-TERM GOALS UPDATE:   Time frame for Short-Term Goals: 5-7 days    LONG-TERM GOALS UPDATE: Time frame for Long-Term Goals: 6 months  Members Present in Team Meeting: See [address], AYSE

## 2019-03-30 LAB
ABSOLUTE EOS #: 0.1 K/UL (ref 0–0.4)
ABSOLUTE IMMATURE GRANULOCYTE: NORMAL K/UL (ref 0–0.3)
ABSOLUTE LYMPH #: 2.1 K/UL (ref 1–4.8)
ABSOLUTE MONO #: 0.5 K/UL (ref 0.1–1.3)
ALBUMIN SERPL-MCNC: 3.6 G/DL (ref 3.5–5.2)
ALBUMIN/GLOBULIN RATIO: ABNORMAL (ref 1–2.5)
ALP BLD-CCNC: 57 U/L (ref 35–104)
ALT SERPL-CCNC: 20 U/L (ref 5–33)
ANION GAP SERPL CALCULATED.3IONS-SCNC: 11 MMOL/L (ref 9–17)
AST SERPL-CCNC: 17 U/L
BASOPHILS # BLD: 1 % (ref 0–2)
BASOPHILS ABSOLUTE: 0 K/UL (ref 0–0.2)
BILIRUB SERPL-MCNC: <0.15 MG/DL (ref 0.3–1.2)
BUN BLDV-MCNC: 11 MG/DL (ref 6–20)
BUN/CREAT BLD: ABNORMAL (ref 9–20)
CALCIUM SERPL-MCNC: 9 MG/DL (ref 8.6–10.4)
CHLORIDE BLD-SCNC: 103 MMOL/L (ref 98–107)
CHOLESTEROL/HDL RATIO: 4.6
CHOLESTEROL: 164 MG/DL
CO2: 25 MMOL/L (ref 20–31)
CREAT SERPL-MCNC: 0.76 MG/DL (ref 0.5–0.9)
DIFFERENTIAL TYPE: NORMAL
EOSINOPHILS RELATIVE PERCENT: 2 % (ref 0–4)
GFR AFRICAN AMERICAN: >60 ML/MIN
GFR NON-AFRICAN AMERICAN: >60 ML/MIN
GFR SERPL CREATININE-BSD FRML MDRD: ABNORMAL ML/MIN/{1.73_M2}
GFR SERPL CREATININE-BSD FRML MDRD: ABNORMAL ML/MIN/{1.73_M2}
GLUCOSE BLD-MCNC: 83 MG/DL (ref 70–99)
HCT VFR BLD CALC: 38.7 % (ref 36–46)
HDLC SERPL-MCNC: 36 MG/DL
HEMOGLOBIN: 12.9 G/DL (ref 12–16)
IMMATURE GRANULOCYTES: NORMAL %
LDL CHOLESTEROL: 69 MG/DL (ref 0–130)
LYMPHOCYTES # BLD: 33 % (ref 24–44)
MCH RBC QN AUTO: 29.4 PG (ref 26–34)
MCHC RBC AUTO-ENTMCNC: 33.3 G/DL (ref 31–37)
MCV RBC AUTO: 88.2 FL (ref 80–100)
MONOCYTES # BLD: 7 % (ref 1–7)
NRBC AUTOMATED: NORMAL PER 100 WBC
PDW BLD-RTO: 12.2 % (ref 11.5–14.9)
PLATELET # BLD: 324 K/UL (ref 150–450)
PLATELET ESTIMATE: NORMAL
PMV BLD AUTO: 6.8 FL (ref 6–12)
POTASSIUM SERPL-SCNC: 4.2 MMOL/L (ref 3.7–5.3)
RBC # BLD: 4.39 M/UL (ref 4–5.2)
RBC # BLD: NORMAL 10*6/UL
SEG NEUTROPHILS: 57 % (ref 36–66)
SEGMENTED NEUTROPHILS ABSOLUTE COUNT: 3.6 K/UL (ref 1.3–9.1)
SODIUM BLD-SCNC: 139 MMOL/L (ref 135–144)
THYROXINE, FREE: 1.02 NG/DL (ref 0.93–1.7)
TOTAL PROTEIN: 6.5 G/DL (ref 6.4–8.3)
TRIGL SERPL-MCNC: 293 MG/DL
TSH SERPL DL<=0.05 MIU/L-ACNC: 0.4 MIU/L (ref 0.3–5)
VLDLC SERPL CALC-MCNC: ABNORMAL MG/DL (ref 1–30)
WBC # BLD: 6.3 K/UL (ref 3.5–11)
WBC # BLD: NORMAL 10*3/UL

## 2019-03-30 PROCEDURE — 6370000000 HC RX 637 (ALT 250 FOR IP): Performed by: NURSE PRACTITIONER

## 2019-03-30 PROCEDURE — 1240000000 HC EMOTIONAL WELLNESS R&B

## 2019-03-30 PROCEDURE — 83036 HEMOGLOBIN GLYCOSYLATED A1C: CPT

## 2019-03-30 PROCEDURE — 36415 COLL VENOUS BLD VENIPUNCTURE: CPT

## 2019-03-30 PROCEDURE — 84443 ASSAY THYROID STIM HORMONE: CPT

## 2019-03-30 PROCEDURE — 84439 ASSAY OF FREE THYROXINE: CPT

## 2019-03-30 PROCEDURE — 85025 COMPLETE CBC W/AUTO DIFF WBC: CPT

## 2019-03-30 PROCEDURE — 99231 SBSQ HOSP IP/OBS SF/LOW 25: CPT | Performed by: NURSE PRACTITIONER

## 2019-03-30 PROCEDURE — 80053 COMPREHEN METABOLIC PANEL: CPT

## 2019-03-30 PROCEDURE — 80061 LIPID PANEL: CPT

## 2019-03-30 RX ORDER — NORETHINDRONE ACETATE AND ETHINYL ESTRADIOL AND FERROUS FUMARATE 1MG-20(24)
1 KIT ORAL DAILY
Status: DISCONTINUED | OUTPATIENT
Start: 2019-03-30 | End: 2019-03-31 | Stop reason: HOSPADM

## 2019-03-30 RX ADMIN — TRAZODONE HYDROCHLORIDE 50 MG: 50 TABLET ORAL at 22:39

## 2019-03-30 RX ADMIN — AMOXICILLIN 500 MG: 500 CAPSULE ORAL at 09:00

## 2019-03-30 RX ADMIN — HYDROXYZINE HYDROCHLORIDE 25 MG: 25 TABLET, FILM COATED ORAL at 22:39

## 2019-03-30 RX ADMIN — AMOXICILLIN 500 MG: 500 CAPSULE ORAL at 22:39

## 2019-03-30 RX ADMIN — CITALOPRAM HYDROBROMIDE 20 MG: 20 TABLET ORAL at 09:01

## 2019-03-30 NOTE — PLAN OF CARE
Problem: Depressive Behavior With or Without Suicide Precautions:  Goal: Able to verbalize acceptance of life and situations over which he or she has no control  Description  Able to verbalize acceptance of life and situations over which he or she has no control  3/29/2019 2056 by Sloane Rankin RN  Outcome: Ongoing  Note:   Patient has been calm, controlled and med complaint. Accepting of 1:1 talk time with staff. 1:1 with pt x ten minutes. Pt encouraged to attend unit programming and interact with peers and staff. Pt also encouraged to tend to hygiene and ADLs. Pt encouraged to discuss feelings with staff and feedback and reassurance provided. Patient has been out in the dayroom and social with peers. Reports anxiety and depression 4/10 this evening. Problem: Depressive Behavior With or Without Suicide Precautions:  Goal: Ability to disclose and discuss suicidal ideas will improve  Description  Ability to disclose and discuss suicidal ideas will improve  3/29/2019 2056 by Sloane Rankin RN  Outcome: Ongoing  Note:   Patient denies suicidal and homicidal thoughts. Patient denies auditory and visual hallucinations. Patient is taking meds and eating well. No self harm noted this shift. Patient agrees to seek staff out if negative thoughts arise. Will continue to monitor Q15 minute and intermittently.

## 2019-03-30 NOTE — GROUP NOTE
Group Therapy Note    Date: March 30    Group Start Time: 0100  Group End Time: 1050  Group Topic: Psychoeducation    NICK LAUREANO    HEMANTH Duggan LSW      Group Therapy Note           Patient's Goal:  n/a    Notes:  n/a    Status After Intervention:  Decompensated    Participation Level:  Active Listener and Interactive    Participation Quality: Appropriate, Attentive and Sharing      Speech:  normal      Thought Process/Content: Linear      Affective Functioning: Congruent      Mood: depressed      Level of consciousness:  Alert, Oriented x4 and Attentive      Response to Learning: Able to verbalize current knowledge/experience, Able to verbalize/acknowledge new learning and Progressing to goal      Endings: None Reported    Modes of Intervention: Education, Support, Socialization and Exploration      Discipline Responsible: /Counselor      Signature:  HEMANTH Duggan LSW

## 2019-03-30 NOTE — PROGRESS NOTES
Department of Psychiatry  Attending Physician Progress Note    Chief Complaint: Major depression, recurrent ([address]     SUBJECTIVE:   Patient is slightly elevated as she is anxious about and wanting to discuss discharge planning. States that she is easily overwhelmed, repeatedly discussing her relationship with  and being overwhelmed. Also discussed and reported father's suicide. Patient was fairly wanting to engage in multiple conversations. States having racing thoughts but immediately next wants to talk about discharge. Still easily overwhelmed and feels that her relationship with  is problematic. Does admit being involved with couples therapy would be beneficial.    Patient was asking about possible FMLA paperwork but has not worked enough to qualify due to just starting back to work last fall and not having worked over the last 2 years prior. There is no safe alternative other than the hospital treatment at this time. OBJECTIVE    Physical  VITALS:    /85   Pulse 81   Temp 98.1 °F (36.7 °C) (Oral)   Resp 14   Ht 5' 1\" (1.549 m)   Wt 160 lb (72.6 kg)   LMP 02/27/2019   SpO2 98%   BMI 30.23 kg/m²     Mental Status Examination:    Level of consciousness:  Within normal limits  Appearance: Street clothes, seated, with good grooming  Behavior/Motor: No abnormalities noted  Attitude toward examiner:  Cooperative, attentive, good eye contact  Speech:  spontaneous, normal rate, normal volume and well articulated  Mood:  Depressed   Affect:  Mood-congruent, constricted in range. Thought processes:  linear  Thought content:  denies homicidal ideation  Suicidal Ideation:  less suicidal ideation  Delusions:  Denies  Perceptual Disturbance:  No visual or auditory hallucinations. Cognition:  Intact  Memory: grossly intact.   Insight & Judgement: Poor       Medications  Current Facility-Administered Medications: acetaminophen (TYLENOL) tablet 650 mg, 650 mg, Oral, Q4H PRN  aluminum & magnesium hydroxide-simethicone (MAALOX) 200-200-20 MG/5ML suspension 30 mL, 30 mL, Oral, Q6H PRN  benztropine mesylate (COGENTIN) injection 2 mg, 2 mg, Intramuscular, BID PRN  hydrOXYzine (ATARAX) tablet 25 mg, 25 mg, Oral, TID PRN  magnesium hydroxide (MILK OF MAGNESIA) 400 MG/5ML suspension 30 mL, 30 mL, Oral, Daily PRN  nicotine (NICODERM CQ) 14 MG/24HR 1 patch, 1 patch, Transdermal, Daily  nicotine polacrilex (NICORETTE) gum 2 mg, 2 mg, Oral, Q2H PRN  traZODone (DESYREL) tablet 50 mg, 50 mg, Oral, Nightly PRN  citalopram (CELEXA) tablet 20 mg, 20 mg, Oral, Daily  amoxicillin (AMOXIL) capsule 500 mg, 500 mg, Oral, 2 times per day    ASSESSMENT     Principal Problem:    Major depression, recurrent ([facility])  Active Problems:    Depression with suicidal ideation  Resolved Problems:    * No resolved hospital problems. *      PLAN    · Continue Celexa and hydroxyzine. · Couple therapy was advised. She may have a family meeting before discharge and should continue couple therapy after discharge. · Continue unit milieu and group psychotherapy. · May need to consider pt's behavior are based on some characterological/personality issues.   · Consider discharge tomorrow 3/31/19    Electronically Signed by Jacklyn Paulson RN, NP , 3/30/2019 2:30 PM

## 2019-03-30 NOTE — GROUP NOTE
Group Therapy Note    Date: March 30    Group Start Time: 6150  Group End Time: 0500  Group Topic: Community Meeting    NICK BHMONO G    Wanda Arleth [place], [address]    Pt did not attend [employer]97 d/t resting in room despite staff invitation to attend.

## 2019-03-31 VITALS
DIASTOLIC BLOOD PRESSURE: 75 MMHG | HEIGHT: 61 IN | OXYGEN SATURATION: 98 % | SYSTOLIC BLOOD PRESSURE: 129 MMHG | BODY MASS INDEX: 30.21 KG/M2 | TEMPERATURE: 98.9 F | WEIGHT: 160 LBS | RESPIRATION RATE: 14 BRPM | HEART RATE: 91 BPM

## 2019-03-31 PROBLEM — R45.851 DEPRESSION WITH SUICIDAL IDEATION: Status: RESOLVED | Noted: 2019-03-28 | Resolved: 2019-03-31

## 2019-03-31 PROBLEM — F32.A DEPRESSION WITH SUICIDAL IDEATION: Status: RESOLVED | Noted: 2019-03-28 | Resolved: 2019-03-31

## 2019-03-31 LAB
ESTIMATED AVERAGE GLUCOSE: 105 MG/DL
HBA1C MFR BLD: 5.3 % (ref 4–6)

## 2019-03-31 PROCEDURE — 99239 HOSP IP/OBS DSCHRG MGMT >30: CPT | Performed by: NURSE PRACTITIONER

## 2019-03-31 PROCEDURE — 6370000000 HC RX 637 (ALT 250 FOR IP): Performed by: NURSE PRACTITIONER

## 2019-03-31 RX ORDER — CITALOPRAM 20 MG/1
20 TABLET ORAL DAILY
Qty: 30 TABLET | Refills: 0 | Status: SHIPPED | OUTPATIENT
Start: 2019-04-01 | End: 2020-02-21

## 2019-03-31 RX ORDER — TRAZODONE HYDROCHLORIDE 50 MG/1
50 TABLET ORAL NIGHTLY PRN
Qty: 15 TABLET | Refills: 0 | Status: SHIPPED | OUTPATIENT
Start: 2019-03-31 | End: 2020-05-13

## 2019-03-31 RX ORDER — AMOXICILLIN 500 MG/1
500 CAPSULE ORAL EVERY 12 HOURS SCHEDULED
Qty: 14 CAPSULE | Refills: 0 | Status: SHIPPED | OUTPATIENT
Start: 2019-03-31 | End: 2019-04-07

## 2019-03-31 RX ORDER — HYDROXYZINE HYDROCHLORIDE 25 MG/1
25 TABLET, FILM COATED ORAL 3 TIMES DAILY PRN
Qty: 30 TABLET | Refills: 0 | Status: SHIPPED | OUTPATIENT
Start: 2019-03-31 | End: 2019-04-10

## 2019-03-31 RX ADMIN — CITALOPRAM HYDROBROMIDE 20 MG: 20 TABLET ORAL at 09:08

## 2019-03-31 RX ADMIN — NORETHINDRONE ACETATE AND ETHINYL ESTRADIOL AND FERROUS FUMARATE 1 TABLET: KIT at 09:09

## 2019-03-31 RX ADMIN — HYDROXYZINE HYDROCHLORIDE 25 MG: 25 TABLET, FILM COATED ORAL at 09:10

## 2019-03-31 RX ADMIN — AMOXICILLIN 500 MG: 500 CAPSULE ORAL at 09:08

## 2019-03-31 NOTE — GROUP NOTE
Group Therapy Note    Date: March 31    Group Start Time: 1000  Group End Time: 1050  Group Topic: Psychotherapy    [facility]    HEMANTH Brasher LSW      Group Therapy Note           Patient's Goal:  n/a    Notes:  n/a    Status After Intervention:  Unchanged    Participation Level:  Active Listener and Interactive    Participation Quality: Appropriate, Attentive, Sharing and Supportive      Speech:  normal      Thought Process/Content: Logical      Affective Functioning: Congruent      Mood: euthymic      Level of consciousness:  Alert, Oriented x4 and Attentive      Response to Learning: Able to verbalize current knowledge/experience and Progressing to goal      Endings: None Reported    Modes of Intervention: Education, Support and Socialization      Discipline Responsible: /Counselor      Signature:  HEMANTH Brasher LSW

## 2019-03-31 NOTE — PLAN OF CARE
Problem: Depressive Behavior With or Without Suicide Precautions:  Goal: Able to verbalize and/or display a decrease in depressive symptoms  Description  Able to verbalize and/or display a decrease in depressive symptoms  3/30/2019 2213 by Marco Verdin LPN  Outcome: Ongoing     Problem: Depressive Behavior With or Without Suicide Precautions:  Goal: Ability to disclose and discuss suicidal ideas will improve  Description  Ability to disclose and discuss suicidal ideas will improve  Outcome: Ongoing     Problem: Depressive Behavior With or Without Suicide Precautions:  Goal: Absence of self-harm  Description  Absence of self-harm  Outcome: Ongoing  Patient denies suicidal and homicidal ideation at this time. Patient denies depression at this time. Patient was out in dayroom most of the evening and social with peers. Patient encouraged to participate in unit group programming/work with staff to identify any other life situations that are not within ability of control and to learn how to accept and deal with them. Patient is free of self harm at this time. Patient agrees to seek out staff if thoughts to harm self arise. Staff will provide support and reassurance as needed. Safety checks maintained every 15 minutes.

## 2019-03-31 NOTE — PLAN OF CARE
Pt. Remains free from self harm and is safe on the unit. Pt. Denies any suicidal or homicidal ideations and reports no auditory/visual hallucinations. Pt. Has been social in the dayroom and is calm and cooperative. Q15min checks continued for safety.

## 2019-03-31 NOTE — PLAN OF CARE
PSYCHOEDUCATION GROUP NOTE    Date: 3/31/19  Start Time: 1100  End Time: 2569    Number Participants in Group:  7/18    Goal:  Patient will demonstrate increased interpersonal interaction   Topic: Leisure Awareness/Socialization/Impulse Control     Discipline Responsible:   OT  AT  [facility]. x RT MHP Other       Participation Level:     None  Minimal   x Active Listener x Interactive    Monopolizing         Participation Quality:  x Appropriate  Inappropriate   x       Attentive        Intrusive          Sharing        Resistant          Supportive        Lethargic       Affective:   x Congruent  Incongruent  Blunted  Flat    Constricted  Anxious  Elated  Angry    Labile  Depressed  Other         Cognitive:  x Alert x Oriented PPTP     Concentration x G  F  P   Attention Span x G  F  P   Short-Term Memory x G  F  P   Long-Term Memory x G  F  P   ProblemSolving/  Decision Making x G  F  P   Ability to Process  Information x G  F  P      Contributing Factors             Delusional             Hallucinating             Flight of Ideas             Other:       Modes of Intervention:   Education x Support x Exploration    Clarifying x Problem Solving  Confrontation   x Socialization  Limit Setting x Reality Testing   x Activity  Movement  Media    Other:            Response to Learning:   Able to verbalize current knowledge/experience    Able to verbalize/acknowledge new learning    Able to retain information    Capable of insight    Able to change behavior   x Progressing to goal    Other:        Comments:

## 2019-03-31 NOTE — BH NOTE
[address] [facility]  Discharge Note    Pt discharged with followings belongings:   Dentures: None  Vision - Corrective Lenses: None  Hearing Aid: None  Jewelry: Ring, Necklace  Clothing: Sweater, Pants, Socks, Jacket / coat, Undergarments (Comment), Shirt, Footwear, Other (Comment)(hat and tolietries)  Were All Patient Medications Collected?: Yes  Other Valuables: Cell phone, Other (Comment), Wallet, Money (Comment)(giftcards, lotto ticket, ID)   Valuables sent home with patient. Valuables retrieved from safe, Security envelope number:  [id number] and returned to patient. Patient left department with Departure Mode: With spouse via Mobility at Departure: Ambulatory, discharged to Discharged to: Private Residence. Patient education on aftercare instructions: yes  Information faxed to  by RN Patient verbalize understanding of AVS:  yes.     Status EXAM upon discharge:  Status and Exam  Normal: Yes  Facial Expression: Brightened  Affect: Appropriate  Level of Consciousness: Alert  Mood:Normal: No  Mood: Anxious  Motor Activity:Normal: Yes  Motor Activity: Increased  Interview Behavior: Cooperative  Preception: [place] to Person, Karlynn Antoinette to Time, [place] to Place, [place] to Situation  Attention:Normal: Yes  Attention: Distractible  Thought Processes: Circumstantial  Thought Content:Normal: Yes  Thought Content: Preoccupations  Hallucinations: None  Delusions: No  Delusions: Persecution  Memory:Normal: Yes  Memory: Poor Recent  Insight and Judgment: No  Insight and Judgment: Poor Judgment, Poor Insight  Present Suicidal Ideation: No  Present Homicidal Ideation: No    Ric Rosa RN
EVENING GROUP NOTE    Date:  03/30/2019 Start Time: 8:15pm  End Time: 8:50pm    Number Participants in Group:  12/18    Goal:  Patient will demonstrate increased interpersonal interaction   Topic:  Wrap Up and Relaxation Groups    Discipline Responsible:   OT  AT   x Nsg.  RT  Other       Participation Level:     None  Minimal   x Active Listener x Interactive    Monopolizing         Participation Quality:  x Appropriate  Inappropriate   x       Attentive        Intrusive   x       Sharing        Resistant   x       Supportive        Lethargic       Affective:   x Congruent  Incongruent  Blunted  Flat    Constricted  Anxious  Elated  Angry    Labile  Depressed  Other         Cognitive:  x Alert  Oriented PPTP     Concentration x G  F  P   Attention Span x G  F  P   Short-Term Memory x G  F  P   Long-Term Memory x G  F  P   ProblemSolving/  Decision Making x G  F  P   Ability to Process  Information x G  F  P      Contributing Factors             Delusional             Hallucinating             Flight of Ideas             Other:       Modes of Intervention:  x Education  Support  Exploration   x Clarifying  Problem Solving  Confrontation    Socialization  Limit Setting  Reality Testing    Activity  Movement  Media    Other:            Response to Learning:  x Able to verbalize current knowledge/experience   x Able to verbalize/acknowledge new learning    Able to retain information    Capable of insight    Able to change behavior    Progressing to goal    Other:        Comments:  Pt attended and participated in  Wrap Up and Relaxation Groups this evening.
Patient given tobacco quitline number 35847557501 at this time, refusing to call at this time, states \" I don't smoke\"- patient given information as to the dangers of long term tobacco use. Continue to reinforce the importance of tobacco cessation.  Jessica Pepe RN
Patient is a non-smoker.
Patient is complaining of anxiety at this time. Stating that they feel restless and are having trouble sleeping and claming down in order to rest this evening. Medication was given as prescribed for increased anxiety.
Pt did not participate  In feedback group  at 1600 due to resting in room after much encouragement.
Pt participated in open rec group at 1430.
Pt. did not attend goals group despite staff encouragement.
Reviewed LPN charting for this shift.
Signed IN: patient jorge voluntary admission with .  Sheyla Zeng RN
Counseling, on admission, catherine X, if applicable and completed (first 3 are required if patient doesn't refuse):            ( )  Recognizing danger situations (included triggers and roadblocks)                    ( )  Coping skills (new ways to manage stress, exercise, relaxation techniques, changing routine, distraction)                                                           ( )  Basic information about quitting (benefits of quitting, techniques in how to quit, available resources  ( ) Referral for counseling faxed to [facility]                                             ( ) Patient refused counseling  (X) Patient has not smoked in the last 30 days    Metabolic Screening:    Lab Results   Component Value Date    LABA1C 4.9 10/12/2015       No results for input(s): CHOL, TRIG, HDL, LDLCALC, LABVLDL in the last 72 hours. Body mass index is 30.23 kg/m². BP Readings from Last 2 Encounters:   03/28/19 (!) 152/101   03/27/19 126/81           Pt admitted with followings belongings:  Dentures: None  Vision - Corrective Lenses: None  Hearing Aid: None  Jewelry: Ring, Necklace  Clothing: Sweater, Pants, Socks, Jacket / coat, Undergarments (Comment), Shirt, Footwear, Other (Comment)(hat and tolietries)  Were All Patient Medications Collected?: Yes  Other Valuables: Cell phone, Other (Comment), Wallet, Money (Comment)(giftcards, lotto ticket, ID)     Valuables placed in safe in security envelope, number:  \[id number]\. Patient's home medications were sent to Safe in [place]. Patient oriented to surroundings and program expectations and copy of patient rights given. Received admission packet:  Refused at this time. Consents reviewed, Refused until talks with doctor. Patient verbalize understanding:  yes. Patient education on precautions: yes    Patient admitted on involuntary basis to [facility] unit room 210 bed 2 from [facility] ER.  Patient allows assessments and vitals at this time and is already in

## 2019-03-31 NOTE — PLAN OF CARE
Pt. Denies any feelings of anxiety or depression and remains social in the dayroom. Pt. Has expressed her needs to staff appropriately and is calm and cooperative upon assessment. Q15min checks continued for safety.

## 2019-03-31 NOTE — PROGRESS NOTES
DISCHARGE SUMMARY  Patient ID:  Mally Stevens  [id number]  31 y.o.  1980    Admit date: 3/28/2019    Discharge date and time: 3/31/2019  11:15 AM     Admitting Physician: Urbano Thurston MD     Discharge Physician:  Mahendra Marte, RN, NP    Admission Diagnoses: Depression with suicidal ideation [F32.9, R45.851]    Discharge Diagnoses:   Major depression, recurrent ([facility] [address]     Patient Active Problem List   Diagnosis Code    Asthma J45.909    Anxiety F41.9    ASCUS with positive high risk HPV cervical R87.610, R87.810    Dysplasia of cervix, low grade (JOSE 1) N87.0    Major depression, recurrent (HCC) F33.9        Admission Condition: poor    Initial assessment:  Mally Stevens is a 41 yr old female who was admitted to [facility], She has been depressed since her father committed suicide in November via hanging, She cannot stop crying,      She and her  are having some marital problems, He is telling everyone she is depressed, She is just sick of HIM,  She had texted a friend and says she might harm herself to get away from him, She says he is an alcoholic and is mean, He wakes her up in the night to make fun of her, She is a  at [employer], They have a 5yr old 9 yr old and a set of 1 yr old twins, She says they live in a huge home in [place], She cannot keep up the house, and the 4 children under 7 and work full time, He does not manage their money well,. She has a car that does not run and cannot get to work, She is stressed, Crying all day at work, She thinks they are getting , She says he is setting her up to be the bad arpita so he gets custody of the children.     She says she cries every day at school, her life is a mess. She had been talking to someone else about all these problems, and her  is mad.       She has facial pain and sinus infection sx for 2 weeks and cannot go to Dr, She has no money, She expresses how sad and hopeless their life is, She says she and her of 3/31/2019 11:17   Ref.  Range 3/30/2019 07:22   Sodium Latest Ref Range: 135 - 144 mmol/L 139   Potassium Latest Ref Range: 3.7 - 5.3 mmol/L 4.2   Chloride Latest Ref Range: 98 - 107 mmol/L 103   CO2 Latest Ref Range: 20 - 31 mmol/L 25   BUN Latest Ref Range: 6 - 20 mg/dL 11   Creatinine Latest Ref Range: 0.50 - 0.90 mg/dL 0.76   Bun/Cre Ratio Latest Ref Range: 9 - 20  NOT REPORTED   Anion Gap Latest Ref Range: 9 - 17 mmol/L 11   GFR Non- Latest Ref Range: >60 mL/min >60   GFR African American Latest Ref Range: >60 mL/min >60   Glucose Latest Ref Range: 70 - 99 mg/dL 83   Calcium Latest Ref Range: 8.6 - 10.4 mg/dL 9.0   Albumin/Globulin Ratio Latest Ref Range: 1.0 - 2.5  NOT REPORTED   Total Protein Latest Ref Range: 6.4 - 8.3 g/dL 6.5   GFR Comment Unknown Pend   GFR Staging Unknown NOT REPORTED   Chol/HDL Ratio Latest Ref Range: <5  4.6   Cholesterol Latest Ref Range: <200 mg/dL 164   HDL Cholesterol Latest Ref Range: >40 mg/dL 36 (L)   LDL Cholesterol Latest Ref Range: 0 - 130 mg/dL 69   Triglycerides Latest Ref Range: <150 mg/dL 293 (H)   VLDL Latest Ref Range: 1 - 30 mg/dL NOT REPORTED (H)   Albumin Latest Ref Range: 3.5 - 5.2 g/dL 3.6   Alk Phos Latest Ref Range: 35 - 104 U/L 57   ALT Latest Ref Range: 5 - 33 U/L 20   AST Latest Ref Range: <32 U/L 17   Bilirubin Latest Ref Range: 0.3 - 1.2 mg/dL <0.15 (L)   Hemoglobin A1C Latest Ref Range: 4.0 - 6.0 % 5.3   eAG (mg/dL) Latest Units: mg/dL 105   TSH Latest Ref Range: 0.30 - 5.00 mIU/L 0.40   Thyroxine, Free Latest Ref Range: 0.93 - 1.70 ng/dL 1.02   WBC Latest Ref Range: 3.5 - 11.0 k/uL 6.3   RBC Latest Ref Range: 4.0 - 5.2 m/uL 4.39   Hemoglobin Quant Latest Ref Range: 12.0 - 16.0 g/dL 12.9   Hematocrit Latest Ref Range: 36 - 46 % 38.7   MCV Latest Ref Range: 80 - 100 fL 88.2   MCH Latest Ref Range: 26 - 34 pg 29.4   MCHC Latest Ref Range: 31 - 37 g/dL 33.3   MPV Latest Ref Range: 6.0 - 12.0 fL 6.8   RDW Latest Ref Range: 11.5 - 14.9 % 12.2 Platelet Count Latest Ref Range: 150 - 450 k/uL 324   Platelet Estimate Unknown NOT REPORTED   Absolute Mono # Latest Ref Range: 0.1 - 1.3 k/uL 0.50   Eosinophils % Latest Ref Range: 0 - 4 % 2   Basophils # Latest Ref Range: 0.0 - 0.2 k/uL 0.00   Differential Type Unknown NOT REPORTED   Seg Neutrophils Latest Ref Range: 36 - 66 % 57   Segs Absolute Latest Ref Range: 1.3 - 9.1 k/uL 3.60   Lymphocytes Latest Ref Range: 24 - 44 % 33   Absolute Lymph # Latest Ref Range: 1.0 - 4.8 k/uL 2.10   Monocytes Latest Ref Range: 1 - 7 % 7   Absolute Eos # Latest Ref Range: 0.0 - 0.4 k/uL 0.10   Basophils Latest Ref Range: 0 - 2 % 1   Immature Granulocytes Latest Ref Range: 0 % NOT REPORTED   WBC Morphology Unknown NOT REPORTED   RBC Morphology Unknown NOT REPORTED   Absolute Immature Granulocyte Latest Ref Range: 0.00 - 0.30 k/uL NOT REPORTED   NRBC Automated Latest Units: per 100 WBC NOT REPORTED     Treatments: Psychotropic medications, therapy with group, milieu, and 1:1 with nurses, social workers, PA-C/CNP, and Attending physician.       Discharge Medications:  Current Discharge Medication List      START taking these medications    Details   hydrOXYzine (ATARAX) 25 MG tablet Take 1 tablet by mouth 3 times daily as needed for Anxiety  Qty: 30 tablet, Refills: 0      citalopram (CELEXA) 20 MG tablet Take 1 tablet by mouth daily  Qty: 30 tablet, Refills: 0      traZODone (DESYREL) 50 MG tablet Take 1 tablet by mouth nightly as needed for Sleep  Qty: 15 tablet, Refills: 0      amoxicillin (AMOXIL) 500 MG capsule Take 1 capsule by mouth every 12 hours for 7 days  Qty: 14 capsule, Refills: 0         CONTINUE these medications which have NOT CHANGED    Details   norethindrone-ethinyl estradiol (LOESTRIN FE 1/20) 1-20 MG-MCG per tablet Take 1 tablet by mouth daily  Qty: 1 packet, Refills: 11         STOP taking these medications       sertraline (ZOLOFT) 100 MG tablet Comments:   Reason for Stopping: Was not effective

## 2019-04-01 ASSESSMENT — ENCOUNTER SYMPTOMS
CHEST TIGHTNESS: 0
BLOOD IN STOOL: 0
ABDOMINAL PAIN: 0
RHINORRHEA: 0
VOMITING: 0
SORE THROAT: 0
CONSTIPATION: 0
DIARRHEA: 0
BACK PAIN: 0
SHORTNESS OF BREATH: 0
NAUSEA: 0

## 2019-04-01 NOTE — ED PROVIDER NOTES
[facility] ED  Emergency Department Encounter  Emergency [address] [facility]     Pt Name: Geoffrey Gregory  MRN: [id number]  Didi 1980  Date of evaluation: 19  PCP:  No primary care provider on file. CHIEF COMPLAINT       Chief Complaint   Patient presents with    Psychiatric Evaluation       HISTORY OF PRESENT ILLNESS  (Location/Symptom, Timing/Onset, Context/Setting, Quality, Duration, Modifying Factors, Severity.)      Geoffrey Gregory is a 41 y.o. female who was brought in by  for suicidal ideation after a wellness check from the [facility]. She had a tearful countenance while stating that she has struggled with depression for the last year and is taking Zoloft for it. Additionally, she has spent the past year on a wait list to see a psychiatrist. 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 One friend called the [facility] for a wellness visit, and the officer offered to bring her to the ER vs her  bringing her to the ER and patient opted for her . Patient refers to multiple stresses at home including her  and being an alcoholic, possibly getting a divorce, that she is having an affair, and that she had broken cars at home and that \"her entire life was broken,\" and that she wanted to leave the ER and return to her four kids at home waiting for her. She denies suicidal ideations currently and states that she did not say something like that to the agustin who called for a wellness check, homicidal ideation, auditory or visual hallucinations, N/V/D, fever, chills, SOB, CP. She also denies drinking, smoking, or doing any illicit drugs except for marijuana \"very occasionally. \" I spoke with  who stated 97.3 °F (36.3 °C) (Oral)   Resp 16   LMP 02/27/2019   SpO2 98%     Physical Exam   Constitutional: She is oriented to person, place, and time. She appears well-developed and well-nourished. No distress. Patient is alert and oriented x4, is tearful during exam   HENT:   Head: Normocephalic and atraumatic. Eyes: Pupils are equal, round, and reactive to light. Conjunctivae and EOM are normal. Right eye exhibits no discharge. Left eye exhibits no discharge. Neck: Normal range of motion. Neck supple. Cardiovascular: Normal rate, regular rhythm, normal heart sounds and intact distal pulses. Exam reveals no gallop and no friction rub. No murmur heard. Pulmonary/Chest: Effort normal and breath sounds normal. No stridor. No respiratory distress. She has no wheezes. She has no rales. She exhibits no tenderness. Abdominal: Soft. Bowel sounds are normal. She exhibits no distension and no mass. There is no tenderness. There is no rebound and no guarding. No hernia. Musculoskeletal: Normal range of motion. She exhibits no edema, tenderness or deformity. Neurological: She is alert and oriented to person, place, and time. Skin: Skin is warm and dry. She is not diaphoretic. Psychiatric:   Patient is tearful during exam, depressed mood, sad affect, poor  judgment and thought content       DIFFERENTIAL  DIAGNOSIS     PLAN (LABS / IMAGING / EKG):  No orders of the defined types were placed in this encounter. MEDICATIONS ORDERED:  No orders of the defined types were placed in this encounter. DDX: Suicidal ideations, homicidal ideations, psychosis, lianet, depression,    DIAGNOSTIC RESULTS / [facility]     LABS:  No results found for this visit on 03/27/19.     IMPRESSION: 60-year-old female with history of depression presenting with concern for suicidal ideation after a concerned friend received messages from patient warranting a wellness check by [facility] which eventually that

## 2019-04-19 NOTE — DISCHARGE SUMMARY
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     She cannot \"afford 5 dollars for lunch at school. \" They live in a huge house costing $1600/mo and Day Care is $1600/ month and her car doesn't run, They are broke, Her  teaches at [address], They both have great income and no time to make a budget or plan anything, She is despondant about saving this marriage,      She says she has been depressed her entire life, She grew up and went to [employer] but she was never one of the [employer] and never fit in, She has a lot of people to blame for her feelings, but she doesn't seem to see that,    When I asked her how she treats her Art [employer] She says,,, ohhh They are all rich kids, \"  Mostly in [address] [place] [address] this week for spring break,    Discharged Condition: stable    Indication for Admission: threat to self    History of Present Illnes (present tense wording indicates findings from admission exam on 3/28/2019 and are not necessarily indicative of current findings): Hospital Course:   Upon admission, Ga Cornell was provided a safe secure environment, introduced to unit milieu. Had Celexa started in fabio of Zoloft. Patient participated in groups and individual therapies. Meds were adjusted. After few days of hospital care, patient began to feel improvement. Depression lifted, thoughts to harm self ceased. Sleep improved, appetite was good. On morning rounds 4/18/2019, patient endorsed feeling ready for discharge. Patient denies suicidal or homicidal ideations, denies hallucinations or delusions. Denies SE's from meds. It was decided that pt had achieved maximum benefit from hospital care and can be discharged.     Consults:  None    Significant Diagnostic Studies: Routine labs and diagnostics  Labs for Susan Gan (MRN [id number]) as of 3/31/2019 11:17   Ref.  Range 3/30/2019 07:22   Sodium Latest Ref Range: 135 - 144 mmol/L 139   Potassium Latest Ref Range: 3.7 - 5.3 mmol/L 4.2   Chloride Latest Ref Range: 98 - 107 mmol/L 103   CO2 Latest Ref Range: 20 - 31 mmol/L 25   BUN Latest Ref Range: 6 - 20 mg/dL 11   Creatinine Latest Ref Range: 0.50 - 0.90 mg/dL 0.76   Bun/Cre Ratio Latest Ref Range: 9 - 20  NOT REPORTED   Anion Gap Latest Ref Range: 9 - 17 mmol/L 11   GFR Non- Latest Ref Range: >60 mL/min >60   GFR African American Latest Ref Range: >60 mL/min >60   Glucose Latest Ref Range: 70 - 99 mg/dL 83   Calcium Latest Ref Range: 8.6 - 10.4 mg/dL 9.0   Albumin/Globulin Ratio Latest Ref Range: 1.0 - 2.5  NOT REPORTED   Total Protein Latest Ref Range: 6.4 - 8.3 g/dL 6.5   GFR Comment Unknown Pend   GFR Staging Unknown NOT REPORTED   Chol/HDL Ratio Latest Ref Range: <5  4.6   Cholesterol Latest Ref Range: <200 mg/dL 164   HDL Cholesterol Latest Ref Range: >40 mg/dL 36 (L)   LDL Cholesterol Latest Ref Range: 0 - 130 mg/dL 69   Triglycerides Latest Ref Range: <150 mg/dL 293 (H)   VLDL Latest Ref Range: 1 - 30 mg/dL NOT REPORTED (H)   Albumin Latest Ref Range: 3.5 - 5.2 g/dL 3.6   Alk Phos Latest Ref Range: 35 - 104 U/L 57   ALT Latest Ref Range: 5 - 33 U/L 20   AST Latest Ref Range: <32 U/L 17   Bilirubin Latest Ref Range: 0.3 - 1.2 mg/dL <0.15 (L)   Hemoglobin A1C Latest Ref Range: 4.0 - 6.0 % 5.3   eAG (mg/dL) Latest Units: mg/dL 105   TSH Latest Ref Range: 0.30 - 5.00 mIU/L 0.40   Thyroxine, Free Latest Ref Range: 0.93 - 1.70 ng/dL 1.02   WBC Latest Ref Range: 3.5 - 11.0 k/uL 6.3   RBC Latest Ref Range: 4.0 - 5.2 m/uL 4.39   Hemoglobin Quant Latest Ref Range: 12.0 - 16.0 g/dL 12.9   Hematocrit Latest Ref Range: 36 - 46 % 38.7   MCV Latest Ref Range: 80 - 100 fL 88.2   MCH Latest Ref Range: 26 - 34 pg 29.4   MCHC Latest Ref Range: 31 - 37 g/dL 33.3   MPV Latest Ref Range: 6.0 - 12.0 fL 6.8   RDW Latest Ref Range: 11.5 - 14.9 % 12.2 Platelet Count Latest Ref Range: 150 - 450 k/uL 324   Platelet Estimate Unknown NOT REPORTED   Absolute Mono # Latest Ref Range: 0.1 - 1.3 k/uL 0.50   Eosinophils % Latest Ref Range: 0 - 4 % 2   Basophils # Latest Ref Range: 0.0 - 0.2 k/uL 0.00   Differential Type Unknown NOT REPORTED   Seg Neutrophils Latest Ref Range: 36 - 66 % 57   Segs Absolute Latest Ref Range: 1.3 - 9.1 k/uL 3.60   Lymphocytes Latest Ref Range: 24 - 44 % 33   Absolute Lymph # Latest Ref Range: 1.0 - 4.8 k/uL 2.10   Monocytes Latest Ref Range: 1 - 7 % 7   Absolute Eos # Latest Ref Range: 0.0 - 0.4 k/uL 0.10   Basophils Latest Ref Range: 0 - 2 % 1   Immature Granulocytes Latest Ref Range: 0 % NOT REPORTED   WBC Morphology Unknown NOT REPORTED   RBC Morphology Unknown NOT REPORTED   Absolute Immature Granulocyte Latest Ref Range: 0.00 - 0.30 k/uL NOT REPORTED   NRBC Automated Latest Units: per 100 WBC NOT REPORTED     Treatments: Psychotropic medications, therapy with group, milieu, and 1:1 with nurses, social workers, PA-C/CNP, and Attending physician.       Discharge Medications:  Current Discharge Medication List      START taking these medications    Details   hydrOXYzine (ATARAX) 25 MG tablet Take 1 tablet by mouth 3 times daily as needed for Anxiety  Qty: 30 tablet, Refills: 0      citalopram (CELEXA) 20 MG tablet Take 1 tablet by mouth daily  Qty: 30 tablet, Refills: 0      traZODone (DESYREL) 50 MG tablet Take 1 tablet by mouth nightly as needed for Sleep  Qty: 15 tablet, Refills: 0      amoxicillin (AMOXIL) 500 MG capsule Take 1 capsule by mouth every 12 hours for 7 days  Qty: 14 capsule, Refills: 0         CONTINUE these medications which have NOT CHANGED    Details   norethindrone-ethinyl estradiol (LOESTRIN FE 1/20) 1-20 MG-MCG per tablet Take 1 tablet by mouth daily  Qty: 1 packet, Refills: 11         STOP taking these medications       sertraline (ZOLOFT) 100 MG tablet Comments:   Reason for Stopping: Was not effective Discharge Exam:  Level of consciousness:  Within normal limits  Appearance: Street clothes, seated, with good grooming  Behavior/Motor: No abnormalities noted  Attitude toward examiner:  Cooperative, attentive, good eye contact  Speech:  spontaneous, normal rate, normal volume and well articulated  Mood:  euthymic  Affect:  mood congruent  Thought processes:  linear, goal directed and coherent  Thought content:  Homocidal ideation denies  Suicidal Ideation:  denies suicidal ideation  Delusions:  no evidence of delusions  Perceptual Disturbance:  denies any perceptual disturbance  Cognition:  In tact  Memory: age appropriate  Insight & Judgement: fair  Medication side effects:  denies     Disposition: home    Patient Instructions: Activity: activity as tolerated    Follow-up as scheduled on 4/9/19. Patient is recommended medication management and individual therapy. Patient would benefit from DBT therapy as there is some difficulty with self regulation of emotions and perceptual issues. Time Spent: 35 minutes    Engagement: Patient displayed a good level of engagement with the treatments offered during this admission. Discharge planning, findings, and recommendations were discussed with the patient and the treatment team.    Signed:  Edna Mccollum   4/18/2019  8:45 PM    Dragon voice recognition software used in portions of this document.

## 2019-06-03 ENCOUNTER — TELEPHONE (OUTPATIENT)
Dept: OBGYN CLINIC | Age: 39
End: 2019-06-03

## 2019-06-03 RX ORDER — METRONIDAZOLE 500 MG/1
500 TABLET ORAL 2 TIMES DAILY
Qty: 14 TABLET | Refills: 0 | Status: SHIPPED | OUTPATIENT
Start: 2019-06-03 | End: 2019-06-10

## 2019-06-03 NOTE — TELEPHONE ENCOUNTER
pt has an odor and would like something called in.  does not have pain, itch or discharge.   Please call it in to [address] in [place]

## 2019-07-16 ENCOUNTER — TELEPHONE (OUTPATIENT)
Dept: OBGYN CLINIC | Age: 39
End: 2019-07-16

## 2019-07-18 RX ORDER — MEDROXYPROGESTERONE ACETATE 150 MG/ML
150 INJECTION, SUSPENSION INTRAMUSCULAR
Qty: 1 ML | Refills: 3 | Status: SHIPPED | OUTPATIENT
Start: 2019-07-18 | End: 2020-01-07 | Stop reason: SDUPTHER

## 2019-07-23 ENCOUNTER — OFFICE VISIT (OUTPATIENT)
Dept: OBGYN CLINIC | Age: 39
End: 2019-07-23
Payer: COMMERCIAL

## 2019-07-23 VITALS
WEIGHT: 154 LBS | SYSTOLIC BLOOD PRESSURE: 110 MMHG | HEIGHT: 61 IN | BODY MASS INDEX: 29.07 KG/M2 | DIASTOLIC BLOOD PRESSURE: 70 MMHG

## 2019-07-23 DIAGNOSIS — Z30.09 ENCOUNTER FOR COUNSELING REGARDING CONTRACEPTION: ICD-10-CM

## 2019-07-23 DIAGNOSIS — N94.6 DYSMENORRHEA: Primary | ICD-10-CM

## 2019-07-23 DIAGNOSIS — Z30.013 ENCOUNTER FOR INITIAL PRESCRIPTION OF INJECTABLE CONTRACEPTIVE: ICD-10-CM

## 2019-07-23 PROCEDURE — 96372 THER/PROPH/DIAG INJ SC/IM: CPT | Performed by: NURSE PRACTITIONER

## 2019-07-23 PROCEDURE — 99213 OFFICE O/P EST LOW 20 MIN: CPT | Performed by: NURSE PRACTITIONER

## 2019-07-23 RX ORDER — MEDROXYPROGESTERONE ACETATE 150 MG/ML
150 INJECTION, SUSPENSION INTRAMUSCULAR ONCE
Status: COMPLETED | OUTPATIENT
Start: 2019-07-23 | End: 2019-07-23

## 2019-07-23 RX ADMIN — MEDROXYPROGESTERONE ACETATE 150 MG: 150 INJECTION, SUSPENSION INTRAMUSCULAR at 12:16

## 2019-07-23 ASSESSMENT — ENCOUNTER SYMPTOMS
SHORTNESS OF BREATH: 0
CHEST TIGHTNESS: 0
WHEEZING: 0
COLOR CHANGE: 0
NAUSEA: 0
VOMITING: 0

## 2019-07-23 NOTE — PROGRESS NOTES
Mental Retardation Paternal Uncle        Social History     Tobacco Use    Smoking status: Former Smoker    Smokeless tobacco: Never Used   Substance Use Topics    Alcohol use: No     Alcohol/week: 0.0 standard drinks      Current Outpatient Medications   Medication Sig Dispense Refill    medroxyPROGESTERone (DEPO-PROVERA) 150 MG/ML injection Inject 1 mL into the muscle every 3 months 1 mL 3    citalopram (CELEXA) 20 MG tablet Take 1 tablet by mouth daily 30 tablet 0    traZODone (DESYREL) 50 MG tablet Take 1 tablet by mouth nightly as needed for Sleep 15 tablet 0    norethindrone-ethinyl estradiol (LOESTRIN FE 1/20) 1-20 MG-MCG per tablet Take 1 tablet by mouth daily 1 packet 11     No current facility-administered medications for this visit. No Known Allergies    Health Maintenance   Topic Date Due    Pneumococcal 0-64 years Vaccine (1 of 1 - PPSV23) 12/14/1987    Varicella Vaccine (1 of 2 - 13+ 2-dose series) 12/14/1994    Flu vaccine (1) 09/01/2019    Cervical cancer screen  01/22/2020    DTaP/Tdap/Td vaccine (2 - Td) 04/17/2024    HIV screen  Completed       Subjective:     Review of Systems   Constitutional: Negative for chills, fatigue, fever and unexpected weight change. Eyes: Negative for visual disturbance. Respiratory: Negative for chest tightness, shortness of breath and wheezing. Cardiovascular: Negative for chest pain, palpitations and leg swelling. Gastrointestinal: Negative for nausea and vomiting. Genitourinary: Positive for menstrual problem. Negative for dyspareunia, vaginal bleeding, vaginal discharge and vaginal pain. Musculoskeletal: Negative for neck pain and neck stiffness. Skin: Negative for color change and pallor. Neurological: Negative for dizziness and headaches. Hematological: Negative for adenopathy. Does not bruise/bleed easily. Objective:     Physical Exam   Constitutional: She is oriented to person, place, and time.  She appears well-developed and well-nourished. No distress. HENT:   Head: Normocephalic and atraumatic. Right Ear: External ear normal.   Left Ear: External ear normal.   Nose: Nose normal.   Mouth/Throat: Oropharynx is clear and moist.   Eyes: Pupils are equal, round, and reactive to light. EOM are normal.   Neck: Normal range of motion. Neck supple. No thyromegaly present. Cardiovascular: Normal rate, regular rhythm and normal heart sounds. Exam reveals no gallop and no friction rub. No murmur heard. No calf tenderness or swelling distal pulses intact bilaterally     Pulmonary/Chest: Effort normal and breath sounds normal. She has no wheezes. Abdominal: Soft. Bowel sounds are normal. There is no tenderness. Musculoskeletal: Normal range of motion. Lymphadenopathy:     She has no cervical adenopathy. Neurological: She is alert and oriented to person, place, and time. No cranial nerve deficit. Skin: Skin is warm and dry. No rash noted. She is not diaphoretic. Psychiatric: She has a normal mood and affect. Her behavior is normal. Judgment and thought content normal.   Nursing note and vitals reviewed. /70 (Site: Right Upper Arm, Position: Sitting, Cuff Size: Medium Adult)   Ht 5' 1\" (1.549 m)   Wt 154 lb (69.9 kg)   LMP 07/17/2019   BMI 29.10 kg/m²     Assessment:          Diagnosis Orders   1. Dysmenorrhea  CO INJECTION,THERAP/PROPH/DIAGNOST, IM OR SUBCUT    medroxyPROGESTERone (DEPO-PROVERA) injection 150 mg   2. Encounter for counseling regarding contraception  CO INJECTION,THERAP/PROPH/DIAGNOST, IM OR SUBCUT    medroxyPROGESTERone (DEPO-PROVERA) injection 150 mg   3. Encounter for initial prescription of injectable contraceptive  CO INJECTION,THERAP/PROPH/DIAGNOST, IM OR SUBCUT    medroxyPROGESTERone (DEPO-PROVERA) injection 150 mg     Urine HCG- negative in office today  Plan:      Dysmenorrhea/contraceptive counseling: Negative urine hcg in office.  Will provide first injection today will

## 2019-07-23 NOTE — PATIENT INSTRUCTIONS
Patient Education        Learning About Birth Control: The Shot  What is the shot? The shot is used to prevent pregnancy. You get the shot in your upper arm or rear end (buttocks). The shot gives you a dose of the hormone progestin. The shot is often called by its brand name, Depo-Provera. Progestin prevents pregnancy in these ways: It thickens the mucus in the cervix. This makes it hard for sperm to travel into the uterus. It also thins the lining of the uterus, which makes it harder for a fertilized egg to attach to the uterus. Progestin can sometimes stop the ovaries from releasing an egg each month (ovulation). The shot provides birth control for 3 months at a time. You then need another shot. The shot may cause bone loss. Talk to your doctor about the risks and benefits. How well does it work? In the first year of use:  · When the shot is used exactly as directed, fewer than 1 woman out of 100 has an unplanned pregnancy. · When the shot is not used exactly as directed, 6 women out of 100 have an unplanned pregnancy. Be sure to tell your doctor about any health problems you have or medicines you take. He or she can help you choose the birth control method that is right for you. What are the advantages of the shot? · The shot is one of the most effective methods of birth control. · It's convenient. You need to get a shot only once every 3 months to prevent pregnancy. You don't have to interrupt sex to protect against pregnancy. · It prevents pregnancy for 3 months at a time. You don't have to worry about birth control for this time. · It's safe to use while breastfeeding. · The shot may reduce heavy bleeding and cramping. · The shot doesn't contain estrogen. So you can use it if you don't want to take estrogen or can't take estrogen because you have certain health problems or concerns. What are the disadvantages of the shot?   · The shot doesn't protect against sexually transmitted infections

## 2019-08-28 ENCOUNTER — TELEPHONE (OUTPATIENT)
Dept: OBGYN CLINIC | Age: 39
End: 2019-08-28

## 2019-08-29 RX ORDER — METRONIDAZOLE 500 MG/1
500 TABLET ORAL 2 TIMES DAILY
Qty: 14 TABLET | Refills: 0 | Status: SHIPPED | OUTPATIENT
Start: 2019-08-29 | End: 2019-09-05

## 2019-10-22 ENCOUNTER — NURSE ONLY (OUTPATIENT)
Dept: OBGYN CLINIC | Age: 39
End: 2019-10-22
Payer: COMMERCIAL

## 2019-10-22 DIAGNOSIS — Z30.42 DEPO-PROVERA CONTRACEPTIVE STATUS: ICD-10-CM

## 2019-10-22 DIAGNOSIS — N94.6 DYSMENORRHEA: Primary | ICD-10-CM

## 2019-10-22 PROCEDURE — 96372 THER/PROPH/DIAG INJ SC/IM: CPT | Performed by: NURSE PRACTITIONER

## 2019-10-22 RX ORDER — MEDROXYPROGESTERONE ACETATE 150 MG/ML
150 INJECTION, SUSPENSION INTRAMUSCULAR ONCE
Status: COMPLETED | OUTPATIENT
Start: 2019-10-22 | End: 2019-10-22

## 2019-10-22 RX ADMIN — MEDROXYPROGESTERONE ACETATE 150 MG: 150 INJECTION, SUSPENSION INTRAMUSCULAR at 16:54

## 2020-01-07 RX ORDER — MEDROXYPROGESTERONE ACETATE 150 MG/ML
150 INJECTION, SUSPENSION INTRAMUSCULAR
Qty: 1 ML | Refills: 3 | Status: SHIPPED | OUTPATIENT
Start: 2020-01-07 | End: 2020-05-13

## 2020-01-07 NOTE — TELEPHONE ENCOUNTER
Pt lives in [facility] and needs dr to sign off on rx since a law states nurse practitioner can not- rx pending

## 2020-01-14 ENCOUNTER — NURSE ONLY (OUTPATIENT)
Dept: OBGYN CLINIC | Age: 40
End: 2020-01-14
Payer: COMMERCIAL

## 2020-01-14 PROCEDURE — 96372 THER/PROPH/DIAG INJ SC/IM: CPT | Performed by: NURSE PRACTITIONER

## 2020-01-14 RX ORDER — MEDROXYPROGESTERONE ACETATE 150 MG/ML
150 INJECTION, SUSPENSION INTRAMUSCULAR ONCE
Status: COMPLETED | OUTPATIENT
Start: 2020-01-14 | End: 2020-01-14

## 2020-01-14 RX ADMIN — MEDROXYPROGESTERONE ACETATE 150 MG: 150 INJECTION, SUSPENSION INTRAMUSCULAR at 16:47

## 2020-02-21 ENCOUNTER — OFFICE VISIT (OUTPATIENT)
Dept: OBGYN CLINIC | Age: 40
End: 2020-02-21
Payer: COMMERCIAL

## 2020-02-21 VITALS
DIASTOLIC BLOOD PRESSURE: 60 MMHG | SYSTOLIC BLOOD PRESSURE: 122 MMHG | HEIGHT: 61 IN | BODY MASS INDEX: 28.32 KG/M2 | WEIGHT: 150 LBS

## 2020-02-21 LAB
BILIRUBIN, POC: NEGATIVE
BLOOD URINE, POC: NORMAL
CLARITY, POC: NORMAL
COLOR, POC: YELLOW
CONTROL: PRESENT
GLUCOSE URINE, POC: NEGATIVE
KETONES, POC: NORMAL
LEUKOCYTE EST, POC: NEGATIVE
NITRITE, POC: NEGATIVE
PH, POC: 5
PREGNANCY TEST URINE, POC: NEGATIVE
PROTEIN, POC: NEGATIVE
SPECIFIC GRAVITY, POC: 1
UROBILINOGEN, POC: NEGATIVE

## 2020-02-21 PROCEDURE — 99395 PREV VISIT EST AGE 18-39: CPT | Performed by: OBSTETRICS & GYNECOLOGY

## 2020-02-21 PROCEDURE — 81025 URINE PREGNANCY TEST: CPT | Performed by: OBSTETRICS & GYNECOLOGY

## 2020-02-21 ASSESSMENT — ENCOUNTER SYMPTOMS
RHINORRHEA: 0
CONSTIPATION: 0
VOMITING: 0
SHORTNESS OF BREATH: 0
NAUSEA: 0
DIARRHEA: 0
COUGH: 0
ABDOMINAL PAIN: 0
BACK PAIN: 0

## 2020-02-21 NOTE — PROGRESS NOTES
Negative for dizziness, light-headedness and headaches. Hematological: Does not bruise/bleed easily. Objective:     Physical Exam  Vitals signs and nursing note reviewed. Constitutional:       General: She is not in acute distress. Appearance: She is well-developed. She is not diaphoretic. HENT:      Head: Normocephalic and atraumatic. Right Ear: External ear normal.      Left Ear: External ear normal.      Nose: Nose normal.   Eyes:      Pupils: Pupils are equal, round, and reactive to light. Neck:      Musculoskeletal: Normal range of motion and neck supple. Thyroid: No thyromegaly. Cardiovascular:      Rate and Rhythm: Normal rate and regular rhythm. Heart sounds: Normal heart sounds. No murmur. No friction rub. No gallop. Pulmonary:      Effort: Pulmonary effort is normal. No respiratory distress. Breath sounds: Normal breath sounds. No wheezing. Abdominal:      General: Bowel sounds are normal.      Palpations: Abdomen is soft. Tenderness: There is no abdominal tenderness. Genitourinary:     Comments: Breasts nipples everted, no masses or tenderness, does BSE  Vulva-no lesions  Vagina-pink rugated  Cervix-firm, 2 cm. Nontender, freely movable, no lesions  Uterus-ant. Smooth, firm, nontender, freely movable  Adnexa-no masses or tenderness   Musculoskeletal: Normal range of motion. Lymphadenopathy:      Cervical: No cervical adenopathy. Skin:     General: Skin is warm and dry. Findings: No rash. Neurological:      Mental Status: She is alert and oriented to person, place, and time. Cranial Nerves: No cranial nerve deficit. Deep Tendon Reflexes: Reflexes are normal and symmetric. Psychiatric:         Behavior: Behavior normal.         Thought Content:  Thought content normal.         Judgment: Judgment normal.       /60 (Site: Right Upper Arm, Position: Sitting, Cuff Size: Medium Adult)   Ht 5' 1\" (1.549 m)   Wt 150 lb (68 kg)   BMI Requested? Answer:   Yes     Order Specific Question:   High Risk Patient     Answer:   N/A    POCT urine pregnancy    POCT Urinalysis Dipstick (No Micro)     No orders of the defined types were placed in this encounter. Patient given educational materials - seepatient instructions. Discussed use, benefit, and side effects of prescribed medications. All patient questions answered. Pt voiced understanding. Reviewed health maintenance. Instructed to continue current medications, diet and exercise. Patient agreedwith treatment plan. Follow up as directed.       Electronically signed by Kierra Suresh DO on 2/21/2020at 2:33 PM

## 2020-02-21 NOTE — PROGRESS NOTES
Tiffanie Hendricks came in having a tough time, I usually do her depo injections and she has expressed her home life and what a hard time she is typically having, struggles with anxiety and depression. She stated today when I took her back she may not be able to keep her job due to not being able to concentrate and how bad her anxiety has been due to everything going on with home life. 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 Stated she has not thought of a plan for suicide but does not want to live anymore. We tried calling [facility] at [facility] but they are unable to take her due to us not being a psychiatrist. She admitted she is even scared to drive or concentrate doing that so we are having her get a ride from her mother in law and she will take her to hospital to be admitted. I was present the entire time for exam and conversation with Dr. Lonny Celeste to try and think of ways to help her and make sure we get her the help she needs before leaving the office.

## 2020-03-02 ENCOUNTER — TELEPHONE (OUTPATIENT)
Dept: OBGYN CLINIC | Age: 40
End: 2020-03-02

## 2020-03-05 RX ORDER — FLUCONAZOLE 150 MG/1
150 TABLET ORAL ONCE
Qty: 1 TABLET | Refills: 0 | Status: SHIPPED | OUTPATIENT
Start: 2020-03-05 | End: 2020-03-05

## 2020-03-05 RX ORDER — METRONIDAZOLE 500 MG/1
500 TABLET ORAL 2 TIMES DAILY
Qty: 14 TABLET | Refills: 0 | Status: SHIPPED | OUTPATIENT
Start: 2020-03-05 | End: 2020-03-12

## 2020-03-09 ENCOUNTER — HOSPITAL ENCOUNTER (OUTPATIENT)
Age: 40
Setting detail: SPECIMEN
Discharge: HOME | End: 2020-03-09
Payer: COMMERCIAL

## 2020-03-09 ENCOUNTER — TELEPHONE (OUTPATIENT)
Dept: OBGYN CLINIC | Age: 40
End: 2020-03-09

## 2020-03-09 ENCOUNTER — NURSE ONLY (OUTPATIENT)
Dept: OBGYN CLINIC | Age: 40
End: 2020-03-09
Payer: COMMERCIAL

## 2020-03-09 LAB
-: ABNORMAL
AMORPHOUS: ABNORMAL
BACTERIA: ABNORMAL
BILIRUBIN URINE: NEGATIVE
BILIRUBIN, POC: NEGATIVE
BLOOD URINE, POC: ABNORMAL
CASTS UA: ABNORMAL /LPF (ref 0–8)
CLARITY, POC: ABNORMAL
COLOR, POC: YELLOW
COLOR: YELLOW
COMMENT UA: ABNORMAL
CRYSTALS, UA: ABNORMAL /HPF
EPITHELIAL CELLS UA: ABNORMAL /HPF (ref 0–5)
GLUCOSE URINE, POC: NEGATIVE
GLUCOSE URINE: NEGATIVE
KETONES, POC: NEGATIVE
KETONES, URINE: NEGATIVE
LEUKOCYTE EST, POC: ABNORMAL
LEUKOCYTE ESTERASE, URINE: ABNORMAL
MUCUS: ABNORMAL
NITRITE, POC: NEGATIVE
NITRITE, URINE: NEGATIVE
OTHER OBSERVATIONS UA: ABNORMAL
PH UA: 6.5 (ref 5–8)
PH, POC: 5
PROTEIN UA: NEGATIVE
PROTEIN, POC: NEGATIVE
RBC UA: ABNORMAL /HPF (ref 0–4)
RENAL EPITHELIAL, UA: ABNORMAL /HPF
SPECIFIC GRAVITY UA: 1 (ref 1–1.03)
SPECIFIC GRAVITY, POC: 1
TRICHOMONAS: ABNORMAL
TURBIDITY: CLEAR
URINE HGB: NEGATIVE
UROBILINOGEN, POC: NEGATIVE
UROBILINOGEN, URINE: NORMAL
WBC UA: ABNORMAL /HPF (ref 0–5)
YEAST: ABNORMAL

## 2020-03-09 PROCEDURE — 81003 URINALYSIS AUTO W/O SCOPE: CPT | Performed by: OBSTETRICS & GYNECOLOGY

## 2020-03-09 NOTE — PROGRESS NOTES
Pt was seen for nurse visit only- Burning with urination and not other sx- in office urine dip showed small amounts of Leuks and trace of blood- Urine was sent out for culture- per Dr Reyes Breslow treat with Bactrim until we have official culture back.

## 2020-03-10 LAB
CULTURE: NORMAL
Lab: NORMAL
SPECIMEN DESCRIPTION: NORMAL

## 2020-03-10 RX ORDER — SULFAMETHOXAZOLE AND TRIMETHOPRIM 800; 160 MG/1; MG/1
1 TABLET ORAL 2 TIMES DAILY
Qty: 10 TABLET | Refills: 0 | OUTPATIENT
Start: 2020-03-10 | End: 2020-03-15

## 2020-03-19 ENCOUNTER — TELEPHONE (OUTPATIENT)
Dept: OBGYN CLINIC | Age: 40
End: 2020-03-19

## 2020-03-19 NOTE — TELEPHONE ENCOUNTER
Pt called in and finished all antibiotics but is still having deep inside vaginal burning- pt is wondering if different rx needs to be done, new cultures or otc meds.

## 2020-03-24 RX ORDER — METRONIDAZOLE 7.5 MG/G
GEL VAGINAL
Qty: 1 TUBE | Refills: 1 | Status: SHIPPED | OUTPATIENT
Start: 2020-03-24 | End: 2020-03-31

## 2020-03-27 ENCOUNTER — TELEPHONE (OUTPATIENT)
Dept: OBGYN CLINIC | Age: 40
End: 2020-03-27

## 2020-03-27 NOTE — TELEPHONE ENCOUNTER
Script for [employer] was sent over on the 24th, stated the meds are not working. .. burns when she urinates.      She is unsure what to do and wondered if something else can be called in?? Or further recommendations

## 2020-03-30 ENCOUNTER — OFFICE VISIT (OUTPATIENT)
Dept: OBGYN CLINIC | Age: 40
End: 2020-03-30
Payer: COMMERCIAL

## 2020-03-30 VITALS — DIASTOLIC BLOOD PRESSURE: 72 MMHG | SYSTOLIC BLOOD PRESSURE: 128 MMHG

## 2020-03-30 PROCEDURE — 99213 OFFICE O/P EST LOW 20 MIN: CPT | Performed by: OBSTETRICS & GYNECOLOGY

## 2020-03-30 RX ORDER — CLOBETASOL PROPIONATE 0.5 MG/G
CREAM TOPICAL
Qty: 1 TUBE | Refills: 0 | Status: SHIPPED | OUTPATIENT
Start: 2020-03-30 | End: 2020-05-13

## 2020-03-30 RX ORDER — SECNIDAZOLE 2 G/4.8G
GRANULE ORAL
Qty: 1 EACH | Refills: 0 | OUTPATIENT
Start: 2020-03-30 | End: 2020-05-13

## 2020-03-30 NOTE — PROGRESS NOTES
Worry: Not on file     Inability: Not on file    Transportation needs     Medical: Not on file     Non-medical: Not on file   Tobacco Use    Smoking status: Former Smoker    Smokeless tobacco: Never Used   Substance and Sexual Activity    Alcohol use: No     Alcohol/week: 0.0 standard drinks    Drug use: No     Types: Marijuana     Comment: Few times a year/ rarely per pt.  Sexual activity: Yes     Partners: Male   Lifestyle    Physical activity     Days per week: Not on file     Minutes per session: Not on file    Stress: Not on file   Relationships    Social connections     Talks on phone: Not on file     Gets together: Not on file     Attends [employer] service: Not on file     Active member of club or organization: Not on file     Attends meetings of clubs or organizations: Not on file     Relationship status: Not on file    Intimate partner violence     Fear of current or ex partner: Not on file     Emotionally abused: Not on file     Physically abused: Not on file     Forced sexual activity: Not on file   Other Topics Concern    Not on file   Social History Narrative    Not on file         MEDICATIONS:  Current Outpatient Medications   Medication Sig Dispense Refill    Secnidazole (SOLOSEC) 2 g PACK Take 1 packet by mouth for one dose. 1 each 0    clobetasol (TEMOVATE) 0.05 % cream Apply twice daily externally to the affected area. 14 days total. 1 Tube 0    metroNIDAZOLE (METROGEL VAGINAL) 0.75 % vaginal gel One applicator intravaginally every night for 5 days 1 Tube 1    medroxyPROGESTERone (DEPO-PROVERA) 150 MG/ML injection Inject 1 mL into the muscle every 3 months 1 mL 3    traZODone (DESYREL) 50 MG tablet Take 1 tablet by mouth nightly as needed for Sleep 15 tablet 0     No current facility-administered medications for this visit.         ALLERGIES:  Allergies as of 03/30/2020    (No Known Allergies)     REVIEW OF SYSTEMS:       A minimum of an eleven point review of systems was completed. Review Of Systems (11 point):  Constitutional: No fever, chills or malaise; No weight change or fatigue  Head and Eyes: No vision, Headache, Dizziness or trauma in last 12 months  ENT ROS: No hearing, Tinnitis, sinus or taste problems  Hematological and Lymphatic ROS:No Lymphoma, Von Willebrand's, Hemophillia or Bleeding History  Psych ROS: No Depression, Homicidal thoughts,suicidal thoughts, or anxiety  Breast ROS: No prior breast abnormalities or lumps  Respiratory ROS: No SOB, Pneumoniae,Cough, or Pulmonary Embolism History  Cardiovascular ROS: No Chest Pain with Exertion, Palpitations, Syncope, Edema, Arrhythmia  Gastrointestinal ROS: No Indigestion, Heartburn, Nausea, vomiting, Diarrhea, Constipation,or Bowel Changes; No Bloody Stools or melena  Genito-Urinary ROS: No Dysuria, Hematuria or Nocturia. No Urinary Incontinence or Vaginal Discharge  Musculoskeletal ROS: No Arthralgia, Arthritis,Gout,Osteoporosis or Rheumatism  Neurological ROS: No CVA, Migraines, Epilepsy, Seizure Hx, or Limb Weakness  Dermatological ROS: No Rash, Itching, Hives, Mole Changes or Cancer    Blood pressure 128/72, last menstrual period 03/30/2020, not currently breastfeeding. Abdomen: Soft non-tender; good bowel sounds. No guarding, rebound or rigidity. No CVA tenderness bilaterally. Extremities: No calf tenderness, DTR 2/4, and No edema bilaterally    Pelvic: Vulva and vagina appear normal. Bimanual exam reveals normal uterus and adnexa. Diagnostics:  No results found. Lab Results:  Results for orders placed or performed during the hospital encounter of 03/09/20   Urine Culture   Result Value Ref Range    Specimen Description . CLEAN CATCH URINE     Special Requests NOT REPORTED     Culture NO SIGNIFICANT GROWTH    Urinalysis   Result Value Ref Range    Color, UA YELLOW YELLOW    Turbidity UA CLEAR CLEAR    Glucose, Ur NEGATIVE NEGATIVE    Bilirubin Urine NEGATIVE NEGATIVE    Ketones, Urine NEGATIVE NEGATIVE

## 2020-04-01 ENCOUNTER — TELEPHONE (OUTPATIENT)
Dept: OBGYN CLINIC | Age: 40
End: 2020-04-01

## 2020-04-20 ENCOUNTER — TELEPHONE (OUTPATIENT)
Dept: OBGYN CLINIC | Age: 40
End: 2020-04-20

## 2020-05-01 ENCOUNTER — TELEPHONE (OUTPATIENT)
Dept: OBGYN CLINIC | Age: 40
End: 2020-05-01

## 2020-05-01 RX ORDER — NORETHINDRONE ACETATE AND ETHINYL ESTRADIOL 1MG-20(21)
1 KIT ORAL DAILY
Qty: 1 PACKET | Refills: 11 | Status: SHIPPED | OUTPATIENT
Start: 2020-05-01 | End: 2021-03-01 | Stop reason: SDUPTHER

## 2020-05-13 ENCOUNTER — TELEMEDICINE (OUTPATIENT)
Dept: FAMILY MEDICINE CLINIC | Age: 40
End: 2020-05-13
Payer: COMMERCIAL

## 2020-05-13 VITALS — BODY MASS INDEX: 27.38 KG/M2 | HEIGHT: 61 IN | WEIGHT: 145 LBS

## 2020-05-13 PROCEDURE — 99212 OFFICE O/P EST SF 10 MIN: CPT | Performed by: INTERNAL MEDICINE

## 2020-05-13 ASSESSMENT — ENCOUNTER SYMPTOMS
WHEEZING: 0
NAUSEA: 0
CONSTIPATION: 0
RECTAL PAIN: 0
ABDOMINAL PAIN: 0
CHOKING: 0
VOMITING: 0
CHEST TIGHTNESS: 0
STRIDOR: 0
DIARRHEA: 0
SHORTNESS OF BREATH: 0
COUGH: 0

## 2020-05-13 NOTE — PROGRESS NOTES
Abnormal-     Neck: [x] No visualized mass     Pulmonary/Chest: [x] Respiratory effort normal.  [x] No visualized signs of difficulty breathing or respiratory distress        [] Abnormal-      Musculoskeletal:   [x] Normal gait with no signs of ataxia         [x] Normal range of motion of neck        [] Abnormal-       Neurological:        [x] No Facial Asymmetry (Cranial nerve 7 motor function) (limited exam to video visit)          [x] No gaze palsy        [] Abnormal-         Skin:        [x] No significant exanthematous lesions or discoloration noted on facial skin         [] Abnormal-            Psychiatric:       [] Normal Affect [x] No Hallucinations        [] Abnormal-  Anxious , tearful     Other pertinent observable physical exam findings-     ASSESSMENT/PLAN:  1. Severe episode of recurrent major depressive disorder, without psychotic features ([facility] [address]  Continue vraylar for now   Sent in refills . May need to increase or add ssri/snri or buspar   Has a lot of external stressors ongoing too ' losing house , do not know where they are moving, job changes   No SI/HI  - cariprazine hcl (VRAYLAR) 1.5 MG capsule; Take 1 capsule by mouth daily  Dispense: 30 capsule; Refill: 5    2. Anxiety  F/u in one month for med check   Consider further w/u , diagnosis , treatment then  Call with q/c   - cariprazine hcl (VRAYLAR) 1.5 MG capsule; Take 1 capsule by mouth daily  Dispense: 30 capsule; Refill: 5    3. Dysplasia of cervix, low grade (JOSE 1)  Follow up with obgyn   4. Burning sensation of female pelvis  Possible IC??  CONTINUE pelvic floor muscles   Follow up with urology/new urology appt 5/26   5. Dysfunctional uterine bleeding  Continue ocp   No further depo         No follow-ups on file. Hafsa Salguero is a 46 y.o. female being evaluated by a Virtual Visit (video visit) encounter to address concerns as mentioned above. A caregiver was present when appropriate.  Due to this being a TeleHealth encounter (During DSQBY-05 public health emergency), evaluation of the following organ systems was limited: Vitals/Constitutional/EENT/Resp/CV/GI//MS/Neuro/Skin/Heme-Lymph-Imm. Pursuant to the emergency declaration under the [facility], [address] [place] [address] authority and the [employer] Act, this Virtual Visit was conducted with patient's (and/or legal guardian's) consent, to reduce the patient's risk of exposure to COVID-19 and provide necessary medical care. The patient (and/or legal guardian) has also been advised to contact this office for worsening conditions or problems, and seek emergency medical treatment and/or call 911 if deemed necessary. Patient identification was verified at the start of the visit: Yes    Total time spent on this encounter: 22 minutes    Services were provided through a video synchronous discussion virtually to substitute for in-person clinic visit. Patient and provider were located at their individual homes. --Jovanna Wick DO on 5/13/2020 at 3:21 PM    An electronic signature was used to authenticate this note.

## 2020-07-14 ENCOUNTER — OFFICE VISIT (OUTPATIENT)
Dept: FAMILY MEDICINE CLINIC | Age: 40
End: 2020-07-14
Payer: COMMERCIAL

## 2020-07-14 VITALS
RESPIRATION RATE: 16 BRPM | SYSTOLIC BLOOD PRESSURE: 130 MMHG | BODY MASS INDEX: 27.49 KG/M2 | OXYGEN SATURATION: 97 % | HEIGHT: 61 IN | WEIGHT: 145.6 LBS | HEART RATE: 105 BPM | TEMPERATURE: 96.9 F | DIASTOLIC BLOOD PRESSURE: 78 MMHG

## 2020-07-14 PROCEDURE — 99213 OFFICE O/P EST LOW 20 MIN: CPT | Performed by: INTERNAL MEDICINE

## 2020-07-14 RX ORDER — METHENAMINE, SODIUM PHOSPHATE, MONOBASIC, MONOHYDRATE, PHENYL SALICYLATE, METHYLENE BLUE, AND HYOSCYAMINE SULFATE 120; 40.8; 36; 10; .12 MG/1; MG/1; MG/1; MG/1; MG/1
1 CAPSULE ORAL 4 TIMES DAILY
COMMUNITY
Start: 2020-05-21 | End: 2021-10-18

## 2020-07-14 RX ORDER — ARIPIPRAZOLE 10 MG/1
10 TABLET ORAL NIGHTLY
Qty: 30 TABLET | Refills: 5 | Status: SHIPPED | OUTPATIENT
Start: 2020-07-14 | End: 2021-03-04

## 2020-07-14 RX ORDER — PAROXETINE HYDROCHLORIDE 20 MG/1
20 TABLET, FILM COATED ORAL NIGHTLY
Qty: 30 TABLET | Refills: 5 | Status: SHIPPED | OUTPATIENT
Start: 2020-07-14 | End: 2021-03-01

## 2020-07-14 ASSESSMENT — ENCOUNTER SYMPTOMS
ABDOMINAL PAIN: 0
DIARRHEA: 0
ANAL BLEEDING: 0
CONSTIPATION: 0
BLOOD IN STOOL: 0

## 2020-07-14 NOTE — PROGRESS NOTES
[facility]  [address] [address]  Dept: [phone]  Dept Fax: [phone]    Romana Camarena a 46 y.o. female who presents today for her medical conditions/complaints as notedbelow. Marilyn De La Paz is c/o of   Chief Complaint   Patient presents with    Depression     depression is getting worse.  pt states her medicaiton is not helping at all, per pt she states there is no medicaiton that can help her     Anxiety       HPI:     vraylar not working at all   She is major ly depressed  Not sleeping well and Brigido Half keeps her up at night   She had been on zoloft which made it worse and cannot remember what else she has been on   Did not feel like psych or counseling helped  Was told she might have bipolar once but did not meet every single one of the criteria   No si /hi       Hemoglobin A1C (%)   Date Value   2019 5.3   10/12/2015 4.9         ( goal A1C is < 7)   No results found for: LABMICR  LDL Cholesterol (mg/dL)   Date Value   2019 69       (goal LDL is <100)   AST (U/L)   Date Value   2019 17     ALT (U/L)   Date Value   2019 20     BUN (mg/dL)   Date Value   2019 11     BP Readings from Last 3 Encounters:   20 130/78   20 128/72   20 122/60          (goal 120/80)    Past Medical History:   Diagnosis Date    Anxiety     Asthma     AS A CHILD    Depression     Gestational diabetes mellitus       Past Surgical History:   Procedure Laterality Date     SECTION         Family History   Problem Relation Age of Onset    Spont Abortions Mother     Stroke Mother     Mental Retardation Paternal Uncle        Social History     Tobacco Use    Smoking status: Former Smoker    Smokeless tobacco: Never Used   Substance Use Topics    Alcohol use: No     Alcohol/week: 0.0 standard drinks      Current Outpatient Medications   Medication Sig Dispense Refill    Meth-Hyo-M Bl-Na Phos-Ph Sal (URIBEL) 118 MG CAPS Take 1 capsule by mouth 4 times daily      ARIPiprazole (ABILIFY) 10 MG tablet Take 1 tablet by mouth nightly 30 tablet 5    PARoxetine (PAXIL) 20 MG tablet Take 1 tablet by mouth nightly 30 tablet 5    norethindrone-ethinyl estradiol (LOESTRIN FE 1/20) 1-20 MG-MCG per tablet Take 1 tablet by mouth daily 1 packet 11     No current facility-administered medications for this visit. No Known Allergies       Health Maintenance   Topic Date Due    Varicella vaccine (1 of 2 - 2-dose childhood series) 12/14/1982    Pneumococcal 0-64 years Vaccine (1 of 1 - PPSV23) 12/14/1987    Flu vaccine (1) 09/01/2020    Cervical cancer screen  04/01/2021    DTaP/Tdap/Td vaccine (2 - Td) 04/17/2024    HIV screen  Completed    Hepatitis A vaccine  Aged Out    Hepatitis B vaccine  Aged Out    Hib vaccine  Aged Out    Meningococcal (ACWY) vaccine  Aged Out       Subjective:     Review of Systems   Constitutional: Positive for activity change, appetite change and fatigue. Negative for fever and unexpected weight change. Eyes: Negative for visual disturbance. Cardiovascular: Negative for chest pain, palpitations and leg swelling. Gastrointestinal: Negative for abdominal pain, anal bleeding, blood in stool, constipation and diarrhea. Musculoskeletal: Negative for arthralgias. Skin: Negative for rash. Neurological: Negative for headaches. Hematological: Negative for adenopathy. Does not bruise/bleed easily. Psychiatric/Behavioral: Positive for behavioral problems, decreased concentration and sleep disturbance. Negative for agitation, confusion, hallucinations, self-injury and suicidal ideas. The patient is nervous/anxious. The patient is not hyperactive. Objective:      Physical Exam  Vitals signs and nursing note reviewed. Constitutional:       General: She is not in acute distress. Appearance: Normal appearance. She is well-developed. She is obese.  She is not Findings: No rash. Neurological:      General: No focal deficit present. Mental Status: She is alert and oriented to person, place, and time. Cranial Nerves: Cranial nerves are intact. No cranial nerve deficit. Sensory: No sensory deficit. Motor: Atrophy and abnormal muscle tone present. No weakness. Coordination: Coordination is intact. Coordination normal.      Gait: Gait normal.      Deep Tendon Reflexes:      Reflex Scores:       Patellar reflexes are 1+ on the right side and 1+ on the left side. Achilles reflexes are 2+ on the right side. Psychiatric:         Attention and Perception: Attention and perception normal.         Mood and Affect: Mood is anxious and depressed. Affect is tearful. Speech: Speech normal.         Behavior: Behavior is slowed. Thought Content: Thought content normal. Thought content is not delusional. Thought content does not include homicidal or suicidal plan. Cognition and Memory: Cognition and memory normal.         Judgment: Judgment normal.       /78 (Site: Left Upper Arm, Position: Sitting, Cuff Size: Medium Adult)   Pulse 105   Temp 96.9 °F (36.1 °C) (Tympanic)   Resp 16   Ht 5' 1\" (1.549 m)   Wt 145 lb 9.6 oz (66 kg)   SpO2 97%   BMI 27.51 kg/m²     Assessment:       Diagnosis Orders   1. Anxiety     2. Severe episode of recurrent major depressive disorder, without psychotic features ([facility] [address]               Plan:       Return in about 4 weeks (around 8/11/2020), or if symptoms worsen or fail to improve, for depression med check. No orders of the defined types were placed in this encounter.     Orders Placed This Encounter   Medications    ARIPiprazole (ABILIFY) 10 MG tablet     Sig: Take 1 tablet by mouth nightly     Dispense:  30 tablet     Refill:  5    PARoxetine (PAXIL) 20 MG tablet     Sig: Take 1 tablet by mouth nightly     Dispense:  30 tablet     Refill:  5    stop vraylar   Start abilify 10 mg at night for 1.5 weeks   Then add on paxil nightly 20 mg . Reviewed SE for both   Patient verbalized understanding. F/u in 3-4 weeks for med check   Call with q/c      Patientgiven educational materials - see patient instructions. Discussed use, benefit,and side effects of prescribed medications. All patient questions answered. Ptvoiced understanding. Reviewed health maintenance. Instructed to continue currentmedications, diet and exercise. Patient agreed with treatment plan. Follow up asdirected.      Electronically signed by Hilario Andrew DO on 7/14/2020 at 12:26 PM

## 2020-07-14 NOTE — PROGRESS NOTES
Visit Information    Have you changed or started any medications since your last visit including any over-the-counter medicines, vitamins, or herbal medicines? no   Are you having any side effects from any of your medications? -  no  Have you stopped taking any of your medications? Is so, why? -  no    Have you seen any other physician or provider since your last visit? No  Have you had any other diagnostic tests since your last visit? No  Have you been seen in the emergency room and/or had an admission to a hospital since we last saw you? No  Have you had your routine dental cleaning in the past 6 months? no    Have you activated your [employer] account? If not, what are your barriers?  Yes     Patient Care Team:  Layton Lopez DO as PCP - General (Family Medicine)  Layton Lopez DO as PCP - [facility] Provider  Neptali Mcnair MD as Obstetrician (Perinatology)    Medical History Review  Past Medical, Family, and Social History reviewed and does contribute to the patient presenting condition    Health Maintenance   Topic Date Due    Varicella vaccine (1 of 2 - 2-dose childhood series) 12/14/1982    Pneumococcal 0-64 years Vaccine (1 of 1 - PPSV23) 12/14/1987    Flu vaccine (1) 09/01/2020    Cervical cancer screen  04/01/2021    DTaP/Tdap/Td vaccine (2 - Td) 04/17/2024    HIV screen  Completed    Hepatitis A vaccine  Aged Out    Hepatitis B vaccine  Aged Out    Hib vaccine  Aged Out    Meningococcal (ACWY) vaccine  Aged Out

## 2021-03-01 ENCOUNTER — OFFICE VISIT (OUTPATIENT)
Dept: FAMILY MEDICINE CLINIC | Age: 41
End: 2021-03-01
Payer: COMMERCIAL

## 2021-03-01 VITALS
HEIGHT: 61 IN | DIASTOLIC BLOOD PRESSURE: 74 MMHG | HEART RATE: 118 BPM | WEIGHT: 179 LBS | OXYGEN SATURATION: 98 % | BODY MASS INDEX: 33.79 KG/M2 | SYSTOLIC BLOOD PRESSURE: 124 MMHG | RESPIRATION RATE: 20 BRPM

## 2021-03-01 DIAGNOSIS — R63.5 WEIGHT GAIN: ICD-10-CM

## 2021-03-01 DIAGNOSIS — N93.8 DYSFUNCTIONAL UTERINE BLEEDING: Primary | ICD-10-CM

## 2021-03-01 DIAGNOSIS — F41.9 ANXIETY: ICD-10-CM

## 2021-03-01 DIAGNOSIS — F33.2 SEVERE EPISODE OF RECURRENT MAJOR DEPRESSIVE DISORDER, WITHOUT PSYCHOTIC FEATURES (HCC): ICD-10-CM

## 2021-03-01 PROCEDURE — 99214 OFFICE O/P EST MOD 30 MIN: CPT | Performed by: INTERNAL MEDICINE

## 2021-03-01 RX ORDER — NORETHINDRONE ACETATE AND ETHINYL ESTRADIOL 1MG-20(21)
1 KIT ORAL DAILY
Qty: 1 PACKET | Refills: 11 | Status: SHIPPED | OUTPATIENT
Start: 2021-03-01 | End: 2021-03-30 | Stop reason: SDUPTHER

## 2021-03-01 RX ORDER — CITALOPRAM 20 MG/1
20 TABLET ORAL DAILY
Qty: 30 TABLET | Refills: 3 | Status: SHIPPED | OUTPATIENT
Start: 2021-03-01 | End: 2021-03-30

## 2021-03-01 NOTE — PROGRESS NOTES
Visit Information    Have you changed or started any medications since your last visit including any over-the-counter medicines, vitamins, or herbal medicines? no   Are you having any side effects from any of your medications? -  no  Have you stopped taking any of your medications? Is so, why? -  no    Have you seen any other physician or provider since your last visit? No  Have you had any other diagnostic tests since your last visit? No  Have you been seen in the emergency room and/or had an admission to a hospital since we last saw you? No  Have you had your routine dental cleaning in the past 6 months? no    Have you activated your [employer] account? If not, what are your barriers?  Yes     Patient Care Team:  Kerrie Gould DO as PCP - General (Family Medicine)  Kerrie Gould DO as PCP - [facility]  Lorraine Perez MD as Obstetrician (Perinatology)    Medical History Review  Past Medical, Family, and Social History reviewed and does contribute to the patient presenting condition    Health Maintenance   Topic Date Due    Hepatitis C screen  1981    Varicella vaccine (1 of 2 - 2-dose childhood series) 12/14/1982    Flu vaccine (1) 09/01/2020    Cervical cancer screen  04/01/2021    Pneumococcal 0-64 years Vaccine (1 of 1 - PPSV23) 09/01/2021 (Originally 12/14/1987)    DTaP/Tdap/Td vaccine (2 - Td) 04/17/2024    HIV screen  Completed    Hepatitis A vaccine  Aged Out    Hepatitis B vaccine  Aged Out    Hib vaccine  Aged Out    Meningococcal (ACWY) vaccine  Aged Out

## 2021-03-02 ASSESSMENT — ENCOUNTER SYMPTOMS
ABDOMINAL DISTENTION: 0
SHORTNESS OF BREATH: 0
DIARRHEA: 0
WHEEZING: 0
STRIDOR: 0
CONSTIPATION: 0

## 2021-03-02 NOTE — PROGRESS NOTES
[facility]  [address] [address]  Dept: [phone]  Dept Fax: [phone]    Sung Martel a 45 y.o. female who presents today for her medical conditions/complaints as notedbelow. Elvis Pham is c/o of   Chief Complaint   Patient presents with    Medication Refill     birth control    Depression     states she does not want to get out of bed.      Discuss Medications     forgets to take depression meds    Other     weight gain         HPI:     Here to discuss mood   Depression is very bad   Feels current medications are not working   Most days does not want to get out of bed, does not want to do activities of daily living   No si/hi   She sleeps okay most nights but then does not want to get out of bed  Does state she does not take her mood meds all the time ; will forget them once or twice a week   She takes her birth control daily though   She has been on the celexa and abilify for at least 6 months now ; no se  Does state she has gained about 30 lbs or more in the last 6 months or so though as well   This affects her mood too   She has not really changed her diet or exercise routine   Not doing as much though due to covid     She also states her periods have been more irradic even with the ocp   Does want refill of this           Hemoglobin A1C (%)   Date Value   03/30/2019 5.3   10/12/2015 4.9         ( goal A1C is < 7)   No results found for: LABMICR  LDL Cholesterol (mg/dL)   Date Value   03/30/2019 69       (goal LDL is <100)   AST (U/L)   Date Value   03/30/2019 17     ALT (U/L)   Date Value   03/30/2019 20     BUN (mg/dL)   Date Value   03/30/2019 11     BP Readings from Last 3 Encounters:   03/01/21 124/74   07/14/20 130/78   03/30/20 128/72          (goal 120/80)    Past Medical History:   Diagnosis Date    Anxiety     Asthma     AS A CHILD    Depression     Gestational diabetes mellitus Past Surgical History:   Procedure Laterality Date     SECTION         Family History   Problem Relation Age of Onset    Spont Abortions Mother     Stroke Mother     Mental Retardation Paternal Uncle        Social History     Tobacco Use    Smoking status: Former Smoker    Smokeless tobacco: Never Used   Substance Use Topics    Alcohol use: No     Alcohol/week: 0.0 standard drinks      Current Outpatient Medications   Medication Sig Dispense Refill    citalopram (CELEXA) 20 MG tablet Take 1 tablet by mouth daily 30 tablet 3    norethindrone-ethinyl estradiol (LOESTRIN FE ) 1-20 MG-MCG per tablet Take 1 tablet by mouth daily 1 packet 11    ARIPiprazole (ABILIFY) 10 MG tablet Take 1 tablet by mouth nightly 30 tablet 5    Meth-Hyo-M Bl-Na Phos-Ph Sal (URIBEL) 118 MG CAPS Take 1 capsule by mouth 4 times daily       No current facility-administered medications for this visit. No Known Allergies       Health Maintenance   Topic Date Due    Hepatitis C screen  Never done    Varicella vaccine (1 of 2 - 2-dose childhood series) Never done    Flu vaccine (1) 2020    Cervical cancer screen  2021    Pneumococcal 0-64 years Vaccine (1 of 1 - PPSV23) 2021 (Originally 1987)    DTaP/Tdap/Td vaccine (2 - Td) 2024    HIV screen  Completed    Hepatitis A vaccine  Aged Out    Hepatitis B vaccine  Aged Out    Hib vaccine  Aged Out    Meningococcal (ACWY) vaccine  Aged Out       Subjective:     Review of Systems   Constitutional: Negative for activity change, appetite change, chills, diaphoresis, fatigue, fever and unexpected weight change. Eyes: Negative for visual disturbance. Respiratory: Negative for shortness of breath, wheezing and stridor. Cardiovascular: Negative for chest pain, palpitations and leg swelling. Gastrointestinal: Negative for abdominal distention, constipation and diarrhea. Genitourinary: Positive for menstrual problem and pelvic pain. Negative for decreased urine volume, vaginal bleeding, vaginal discharge and vaginal pain. Musculoskeletal: Negative for arthralgias. Skin: Negative for rash. Allergic/Immunologic: Negative for immunocompromised state. Neurological: Negative for dizziness, weakness, light-headedness, numbness and headaches. Psychiatric/Behavioral: Positive for behavioral problems and decreased concentration. Negative for agitation, confusion, dysphoric mood, hallucinations, self-injury, sleep disturbance and suicidal ideas. The patient is nervous/anxious. The patient is not hyperactive. Objective:      Physical Exam  Vitals signs and nursing note reviewed. Constitutional:       General: She is not in acute distress. Appearance: Normal appearance. She is obese. She is not ill-appearing, toxic-appearing or diaphoretic. HENT:      Head: Normocephalic and atraumatic. Neck:      Musculoskeletal: Normal range of motion and neck supple. Cardiovascular:      Rate and Rhythm: Regular rhythm. Tachycardia present. Heart sounds: Normal heart sounds. No murmur. No gallop. Skin:     General: Skin is warm and dry. Neurological:      General: No focal deficit present. Mental Status: She is alert and oriented to person, place, and time. Psychiatric:         Attention and Perception: Attention normal.         Mood and Affect: Mood is anxious and depressed. Affect is tearful. Speech: Speech normal.         Behavior: Behavior is slowed. Behavior is cooperative. Thought Content: Thought content is not delusional. Thought content does not include homicidal or suicidal plan.          Cognition and Memory: Cognition and memory normal.         Judgment: Judgment normal.       /74   Pulse 118   Resp 20   Ht 5' 1\" (1.549 m)   Wt 179 lb (81.2 kg)   LMP 02/01/2021   SpO2 98%   BMI 33.82 kg/m²     Assessment: Diagnosis Orders   1. Dysfunctional uterine bleeding  TSH with Reflex    Cortisol    hCG, Quantitative, Pregnancy   2. Anxiety  External Referral To Psychology   3. Severe episode of recurrent major depressive disorder, without psychotic features [facility])  External Referral To Psychology   4. Weight gain  TSH with Reflex    Prolactin    Thyroid Antibodies    CBC With Auto Differential    Comprehensive Metabolic Panel    Cortisol             Plan:       Return in about 4 weeks (around 3/29/2021), or if symptoms worsen or fail to improve, for depression med check.     Orders Placed This Encounter   Procedures    TSH with Reflex     Standing Status:   Future     Standing Expiration Date:   3/1/2022    Prolactin     Standing Status:   Future     Standing Expiration Date:   3/1/2022    Thyroid Antibodies     Standing Status:   Future     Standing Expiration Date:   3/1/2022    CBC With Auto Differential     Standing Status:   Future     Standing Expiration Date:   3/1/2022    Comprehensive Metabolic Panel     Standing Status:   Future     Standing Expiration Date:   3/1/2022    Cortisol     Standing Status:   Future     Standing Expiration Date:   3/1/2022     Order Specific Question:   8AM or 4PM?     Answer:   8 am    hCG, Quantitative, Pregnancy     Standing Status:   Future     Standing Expiration Date:   3/1/2022    External Referral To Psychology     Referral Priority:   Routine     Referral Type:   Eval and Treat     Referral Reason:   Specialty Services Required     Referred to Provider:   Blaine Dumont     Requested Specialty:   Psychology     Number of Visits Requested:   1     Orders Placed This Encounter   Medications    citalopram (CELEXA) 20 MG tablet     Sig: Take 1 tablet by mouth daily     Dispense:  30 tablet     Refill:  3    norethindrone-ethinyl estradiol (LOESTRIN FE 1/20) 1-20 MG-MCG per tablet     Sig: Take 1 tablet by mouth daily     Dispense:  1 packet     Refill:  11 continue abilify for now ; may need to change for treatment resistant depression in the future   Will adjust ssri /stp paxil due to ineffective and weight gain   Start celexa 20 mg once daily for anxiety and depression   Reviewed SE   Will refer to therapy/counselign as well   Reviewed non med options as well for mood /anxiety   May need psychiatry referral in the future, will be difficult due to [employer]     For irregular /dub will check labs ; hormones , etc   May be related to stress and weight changes   Will refill ocp ; does take this daily  F/u in 3-4 weeks for med check and results   Call with q/c    Patientgiven educational materials - see patient instructions. Discussed use, benefit,and side effects of prescribed medications. All patient questions answered. Ptvoiced understanding. Reviewed health maintenance. Instructed to continue currentmedications, diet and exercise. Patient agreed with treatment plan. Follow up asdirected.      Electronically signed by Marcelino Eddy DO on 3/2/2021 at 12:49 PM

## 2021-03-04 RX ORDER — ARIPIPRAZOLE 10 MG/1
TABLET ORAL
Qty: 30 TABLET | Refills: 4 | Status: SHIPPED | OUTPATIENT
Start: 2021-03-04 | End: 2021-03-30

## 2021-03-04 RX ORDER — PAROXETINE HYDROCHLORIDE 20 MG/1
TABLET, FILM COATED ORAL
Qty: 30 TABLET | Refills: 4 | Status: SHIPPED | OUTPATIENT
Start: 2021-03-04 | End: 2021-03-30

## 2021-03-29 ENCOUNTER — HOSPITAL ENCOUNTER (OUTPATIENT)
Age: 41
Setting detail: SPECIMEN
Discharge: HOME | End: 2021-03-29
Payer: COMMERCIAL

## 2021-03-29 DIAGNOSIS — N93.8 DYSFUNCTIONAL UTERINE BLEEDING: ICD-10-CM

## 2021-03-29 DIAGNOSIS — R63.5 WEIGHT GAIN: ICD-10-CM

## 2021-03-29 LAB
ABSOLUTE EOS #: 0.29 K/UL (ref 0–0.44)
ABSOLUTE IMMATURE GRANULOCYTE: <0.03 K/UL (ref 0–0.3)
ABSOLUTE LYMPH #: 1.34 K/UL (ref 1.1–3.7)
ABSOLUTE MONO #: 0.43 K/UL (ref 0.1–1.2)
ALBUMIN SERPL-MCNC: 4.3 G/DL (ref 3.5–5.2)
ALBUMIN/GLOBULIN RATIO: 1.4 (ref 1–2.5)
ALP BLD-CCNC: 51 U/L (ref 35–104)
ALT SERPL-CCNC: 23 U/L (ref 5–33)
ANION GAP SERPL CALCULATED.3IONS-SCNC: 20 MMOL/L (ref 9–17)
AST SERPL-CCNC: 24 U/L
BASOPHILS # BLD: 1 % (ref 0–2)
BASOPHILS ABSOLUTE: 0.04 K/UL (ref 0–0.2)
BILIRUB SERPL-MCNC: 0.25 MG/DL (ref 0.3–1.2)
BUN BLDV-MCNC: 12 MG/DL (ref 6–20)
BUN/CREAT BLD: ABNORMAL (ref 9–20)
CALCIUM SERPL-MCNC: 9.2 MG/DL (ref 8.6–10.4)
CHLORIDE BLD-SCNC: 102 MMOL/L (ref 98–107)
CO2: 18 MMOL/L (ref 20–31)
CORTISOL COLLECTION INFO: ABNORMAL
CORTISOL: 21.2 UG/DL (ref 2.7–18.4)
CREAT SERPL-MCNC: 0.69 MG/DL (ref 0.5–0.9)
DIFFERENTIAL TYPE: ABNORMAL
EOSINOPHILS RELATIVE PERCENT: 4 % (ref 1–4)
GFR AFRICAN AMERICAN: >60 ML/MIN
GFR NON-AFRICAN AMERICAN: >60 ML/MIN
GFR SERPL CREATININE-BSD FRML MDRD: ABNORMAL ML/MIN/{1.73_M2}
GFR SERPL CREATININE-BSD FRML MDRD: ABNORMAL ML/MIN/{1.73_M2}
GLUCOSE BLD-MCNC: 132 MG/DL (ref 70–99)
HCG QUANTITATIVE: <1 IU/L
HCT VFR BLD CALC: 40.5 % (ref 36.3–47.1)
HEMOGLOBIN: 12.9 G/DL (ref 11.9–15.1)
IMMATURE GRANULOCYTES: 0 %
LYMPHOCYTES # BLD: 17 % (ref 24–43)
MCH RBC QN AUTO: 29.8 PG (ref 25.2–33.5)
MCHC RBC AUTO-ENTMCNC: 31.9 G/DL (ref 28.4–34.8)
MCV RBC AUTO: 93.5 FL (ref 82.6–102.9)
MONOCYTES # BLD: 5 % (ref 3–12)
NRBC AUTOMATED: 0 PER 100 WBC
PDW BLD-RTO: 12.1 % (ref 11.8–14.4)
PLATELET # BLD: 332 K/UL (ref 138–453)
PLATELET ESTIMATE: ABNORMAL
PMV BLD AUTO: 10 FL (ref 8.1–13.5)
POTASSIUM SERPL-SCNC: 4 MMOL/L (ref 3.7–5.3)
PROLACTIN: 16.98 UG/L (ref 4.79–23.3)
RBC # BLD: 4.33 M/UL (ref 3.95–5.11)
RBC # BLD: ABNORMAL 10*6/UL
SEG NEUTROPHILS: 73 % (ref 36–65)
SEGMENTED NEUTROPHILS ABSOLUTE COUNT: 5.8 K/UL (ref 1.5–8.1)
SODIUM BLD-SCNC: 140 MMOL/L (ref 135–144)
TOTAL PROTEIN: 7.3 G/DL (ref 6.4–8.3)
TSH SERPL DL<=0.05 MIU/L-ACNC: 1.29 MIU/L (ref 0.3–5)
WBC # BLD: 7.9 K/UL (ref 3.5–11.3)
WBC # BLD: ABNORMAL 10*3/UL

## 2021-03-30 ENCOUNTER — OFFICE VISIT (OUTPATIENT)
Dept: FAMILY MEDICINE CLINIC | Age: 41
End: 2021-03-30
Payer: COMMERCIAL

## 2021-03-30 VITALS
RESPIRATION RATE: 20 BRPM | SYSTOLIC BLOOD PRESSURE: 136 MMHG | OXYGEN SATURATION: 98 % | HEART RATE: 100 BPM | BODY MASS INDEX: 33.99 KG/M2 | TEMPERATURE: 97.2 F | HEIGHT: 61 IN | WEIGHT: 180 LBS | DIASTOLIC BLOOD PRESSURE: 84 MMHG

## 2021-03-30 DIAGNOSIS — F41.9 ANXIETY: ICD-10-CM

## 2021-03-30 DIAGNOSIS — R79.89 ELEVATED CORTISOL LEVEL: Primary | ICD-10-CM

## 2021-03-30 DIAGNOSIS — R45.86 MOOD CHANGES: ICD-10-CM

## 2021-03-30 DIAGNOSIS — N93.8 DYSFUNCTIONAL UTERINE BLEEDING: ICD-10-CM

## 2021-03-30 DIAGNOSIS — R63.5 WEIGHT GAIN: ICD-10-CM

## 2021-03-30 LAB
THYROGLOBULIN AB: <12 IU/ML (ref 0–40)
THYROID PEROXIDASE (TPO) AB: <4 IU/ML (ref 0–25)

## 2021-03-30 PROCEDURE — 99213 OFFICE O/P EST LOW 20 MIN: CPT | Performed by: INTERNAL MEDICINE

## 2021-03-30 RX ORDER — NORETHINDRONE ACETATE AND ETHINYL ESTRADIOL 1MG-20(21)
1 KIT ORAL DAILY
Qty: 1 PACKET | Refills: 11 | Status: SHIPPED | OUTPATIENT
Start: 2021-03-30 | End: 2022-04-25 | Stop reason: SDUPTHER

## 2021-03-30 RX ORDER — CITALOPRAM 40 MG/1
40 TABLET ORAL DAILY
Qty: 30 TABLET | Refills: 3 | Status: SHIPPED | OUTPATIENT
Start: 2021-03-30 | End: 2021-10-18 | Stop reason: SDUPTHER

## 2021-03-30 RX ORDER — ARIPIPRAZOLE 15 MG/1
15 TABLET ORAL NIGHTLY
Qty: 30 TABLET | Refills: 5 | Status: SHIPPED | OUTPATIENT
Start: 2021-03-30 | End: 2021-10-18 | Stop reason: SDUPTHER

## 2021-03-30 ASSESSMENT — ENCOUNTER SYMPTOMS
COUGH: 0
WHEEZING: 0
CHOKING: 0
RECTAL PAIN: 0
CONSTIPATION: 0
STRIDOR: 0
SHORTNESS OF BREATH: 0
CHEST TIGHTNESS: 0
VOMITING: 0
DIARRHEA: 0
ABDOMINAL PAIN: 0
NAUSEA: 0

## 2021-03-30 ASSESSMENT — PATIENT HEALTH QUESTIONNAIRE - PHQ9
SUM OF ALL RESPONSES TO PHQ9 QUESTIONS 1 & 2: 5
8. MOVING OR SPEAKING SO SLOWLY THAT OTHER PEOPLE COULD HAVE NOTICED. OR THE OPPOSITE, BEING SO FIGETY OR RESTLESS THAT YOU HAVE BEEN MOVING AROUND A LOT MORE THAN USUAL: 2
3. TROUBLE FALLING OR STAYING ASLEEP: 3
9. THOUGHTS THAT YOU WOULD BE BETTER OFF DEAD, OR OF HURTING YOURSELF: 1
10. IF YOU CHECKED OFF ANY PROBLEMS, HOW DIFFICULT HAVE THESE PROBLEMS MADE IT FOR YOU TO DO YOUR WORK, TAKE CARE OF THINGS AT HOME, OR GET ALONG WITH OTHER PEOPLE: 2
1. LITTLE INTEREST OR PLEASURE IN DOING THINGS: 2
7. TROUBLE CONCENTRATING ON THINGS, SUCH AS READING THE NEWSPAPER OR WATCHING TELEVISION: 3

## 2021-03-30 NOTE — PROGRESS NOTES
Visit Information    Have you changed or started any medications since your last visit including any over-the-counter medicines, vitamins, or herbal medicines? no   Are you having any side effects from any of your medications? -  no  Have you stopped taking any of your medications? Is so, why? -  no    Have you seen any other physician or provider since your last visit? No  Have you had any other diagnostic tests since your last visit? No  Have you been seen in the emergency room and/or had an admission to a hospital since we last saw you? No  Have you had your routine dental cleaning in the past 6 months? no    Have you activated your [employer] account? If not, what are your barriers?  Yes     Patient Care Team:  Abdelrahman Dubois DO as PCP - General (Family Medicine)  Abdelrahman Dubois DO as PCP - [facility] Provider  Tanya Wells MD as Obstetrician (Perinatology)    Medical History Review  Past Medical, Family, and Social History reviewed and does contribute to the patient presenting condition    Health Maintenance   Topic Date Due    Hepatitis C screen  Never done    Varicella vaccine (1 of 2 - 2-dose childhood series) Never done    COVID-19 Vaccine (1) Never done    Flu vaccine (1) 09/01/2020    Cervical cancer screen  04/01/2021    Pneumococcal 0-64 years Vaccine (1 of 1 - PPSV23) 09/01/2021 (Originally 12/14/1987)    DTaP/Tdap/Td vaccine (2 - Td) 04/17/2024    HIV screen  Completed    Hepatitis A vaccine  Aged Out    Hepatitis B vaccine  Aged Out    Hib vaccine  Aged Out    Meningococcal (ACWY) vaccine  Aged Out

## 2021-03-30 NOTE — PROGRESS NOTES
[facility]  [address]  [address] [facility] [address]  Dept: [phone]  Dept Fax: [phone]    Sai Gould a 45 y.o. female who presents today for her medical conditions/complaints as notedbelow. Victoria Machado is c/o of   Chief Complaint   Patient presents with    Medication Check    Depression    Discuss Labs       HPI:     Here for med check   Does not feel much better but then states meds are working   States about the same as paxil vs the celexa she is currently on   No SE  Still taking the Abilify at bedtime   No se  Sleeping okay   No si/hi   Did not get in touch with counselor , would prefer a female and I had suggested dr. Rosann Dubin     Did do blood work   Overall ok but cortisol was elevated based on time of day   Does have some sxs of weight gain w/o change in diet, difficulty to lose  No lyte changes   Mood abnormalities  No rashes/lesions  Has never seen specialist   Blood preg was neg       Hemoglobin A1C (%)   Date Value   2019 5.3   10/12/2015 4.9         ( goal A1C is < 7)   No results found for: LABMICR  LDL Cholesterol (mg/dL)   Date Value   2019 69       (goal LDL is <100)   AST (U/L)   Date Value   2021 24     ALT (U/L)   Date Value   2021 23     BUN (mg/dL)   Date Value   2021 12     BP Readings from Last 3 Encounters:   21 136/84   21 124/74   20 130/78          (goal 120/80)    Past Medical History:   Diagnosis Date    Anxiety     Asthma     AS A CHILD    Depression     Gestational diabetes mellitus       Past Surgical History:   Procedure Laterality Date     SECTION         Family History   Problem Relation Age of Onset    Spont Abortions Mother     Stroke Mother     Mental Retardation Paternal Uncle        Social History     Tobacco Use    Smoking status: Former Smoker    Smokeless tobacco: Never Used   Substance Use Topics    Alcohol use:  No Alcohol/week: 0.0 standard drinks      Current Outpatient Medications   Medication Sig Dispense Refill    norethindrone-ethinyl estradiol (LOESTRIN FE 1/20) 1-20 MG-MCG per tablet Take 1 tablet by mouth daily 1 packet 11    citalopram (CELEXA) 40 MG tablet Take 1 tablet by mouth daily 30 tablet 3    ARIPiprazole (ABILIFY) 15 MG tablet Take 1 tablet by mouth nightly 30 tablet 5    Meth-Hyo-M Bl-Na Phos-Ph Sal (URIBEL) 118 MG CAPS Take 1 capsule by mouth 4 times daily       No current facility-administered medications for this visit. No Known Allergies       Health Maintenance   Topic Date Due    Hepatitis C screen  Never done    Varicella vaccine (1 of 2 - 2-dose childhood series) Never done    COVID-19 Vaccine (1) Never done    Flu vaccine (1) 09/01/2020    Cervical cancer screen  04/01/2021    Pneumococcal 0-64 years Vaccine (1 of 1 - PPSV23) 09/01/2021 (Originally 12/14/1987)    DTaP/Tdap/Td vaccine (2 - Td) 04/17/2024    HIV screen  Completed    Hepatitis A vaccine  Aged Out    Hepatitis B vaccine  Aged Out    Hib vaccine  Aged Out    Meningococcal (ACWY) vaccine  Aged Out       Subjective:     Review of Systems   Constitutional: Positive for activity change, appetite change and unexpected weight change. Negative for chills, diaphoresis, fatigue and fever. Eyes: Negative for visual disturbance. Respiratory: Negative for cough, choking, chest tightness, shortness of breath, wheezing and stridor. Cardiovascular: Negative for chest pain, palpitations and leg swelling. Gastrointestinal: Negative for abdominal pain, constipation, diarrhea, nausea, rectal pain and vomiting. Genitourinary: Positive for menstrual problem. Musculoskeletal: Negative for arthralgias. Skin: Negative for rash. Allergic/Immunologic: Positive for immunocompromised state. Neurological: Negative for dizziness, light-headedness and headaches.    Psychiatric/Behavioral: Positive for behavioral problems, decreased concentration and sleep disturbance. Negative for dysphoric mood, hallucinations, self-injury and suicidal ideas. The patient is nervous/anxious. The patient is not hyperactive. Objective:      Physical Exam  Vitals signs and nursing note reviewed. Constitutional:       General: She is not in acute distress. Appearance: She is obese. She is not ill-appearing, toxic-appearing or diaphoretic. HENT:      Head: Normocephalic and atraumatic. Neck:      Musculoskeletal: Normal range of motion and neck supple. Cardiovascular:      Rate and Rhythm: Normal rate and regular rhythm. Pulses: Normal pulses. Heart sounds: Normal heart sounds. No murmur. No gallop. Pulmonary:      Effort: Pulmonary effort is normal.      Breath sounds: Normal breath sounds. Skin:     General: Skin is warm and dry. Neurological:      General: No focal deficit present. Mental Status: She is alert and oriented to person, place, and time. Cranial Nerves: No cranial nerve deficit. Psychiatric:         Attention and Perception: Attention normal.         Mood and Affect: Affect is blunt. Speech: Speech is delayed. Behavior: Behavior is slowed. Thought Content: Thought content is not delusional. Thought content does not include homicidal or suicidal plan. Cognition and Memory: Cognition normal.         Judgment: Judgment normal.       /84   Pulse 100   Temp 97.2 °F (36.2 °C) (Temporal)   Resp 20   Ht 5' 1\" (1.549 m)   Wt 180 lb (81.6 kg)   LMP 02/28/2021 (Approximate)   SpO2 98%   BMI 34.01 kg/m²     Assessment:       Diagnosis Orders   1. Elevated cortisol level  Elijah Yoon MD, Endocrinology, [place]   2. Weight gain  Elijah Yoon MD, Endocrinology, [place]   3. Dysfunctional uterine bleeding  Elijah Yoon MD, Endocrinology, [place]   4. Mood changes  [place]   5.  Anxiety               Plan:       Return in

## 2021-10-18 ENCOUNTER — OFFICE VISIT (OUTPATIENT)
Dept: FAMILY MEDICINE CLINIC | Age: 41
End: 2021-10-18
Payer: COMMERCIAL

## 2021-10-18 VITALS
TEMPERATURE: 97.1 F | HEART RATE: 89 BPM | SYSTOLIC BLOOD PRESSURE: 124 MMHG | BODY MASS INDEX: 33.42 KG/M2 | DIASTOLIC BLOOD PRESSURE: 74 MMHG | HEIGHT: 61 IN | WEIGHT: 177 LBS | OXYGEN SATURATION: 97 %

## 2021-10-18 DIAGNOSIS — R53.83 FATIGUE, UNSPECIFIED TYPE: ICD-10-CM

## 2021-10-18 DIAGNOSIS — F33.2 SEVERE EPISODE OF RECURRENT MAJOR DEPRESSIVE DISORDER, WITHOUT PSYCHOTIC FEATURES (HCC): Primary | ICD-10-CM

## 2021-10-18 DIAGNOSIS — R63.5 WEIGHT GAIN: ICD-10-CM

## 2021-10-18 DIAGNOSIS — L65.9 HAIR LOSS: ICD-10-CM

## 2021-10-18 PROCEDURE — 99213 OFFICE O/P EST LOW 20 MIN: CPT | Performed by: PHYSICIAN ASSISTANT

## 2021-10-18 RX ORDER — CITALOPRAM 40 MG/1
40 TABLET ORAL DAILY
Qty: 30 TABLET | Refills: 3 | Status: SHIPPED | OUTPATIENT
Start: 2021-10-18 | End: 2022-03-07

## 2021-10-18 RX ORDER — ARIPIPRAZOLE 15 MG/1
15 TABLET ORAL NIGHTLY
Qty: 30 TABLET | Refills: 3 | Status: SHIPPED | OUTPATIENT
Start: 2021-10-18 | End: 2022-03-07

## 2021-10-18 RX ORDER — DEXAMETHASONE 1 MG
TABLET ORAL
Qty: 1 TABLET | Refills: 0 | Status: SHIPPED | OUTPATIENT
Start: 2021-10-18 | End: 2022-04-19

## 2021-10-18 ASSESSMENT — ENCOUNTER SYMPTOMS
COUGH: 0
SHORTNESS OF BREATH: 0
COLOR CHANGE: 0

## 2021-10-18 NOTE — PROGRESS NOTES
[address] [address]-IN FAMILY [facility]ICINE  [address] [address]  Dept: [phone]  Dept Fax: [phone]    Ceci Torres is a 45 y.o. female who presents today for her medical conditions/complaintsas noted below. Ceci Torres is c/o of   Chief Complaint   Patient presents with    Hair/Scalp Problem     need new referral - Dr Conchis Ambrocio is not seeing new patients    Weight Gain         HPI:     HPI    Patient previously seen by Dr. Joseph Martino. She was referred to [facility] for elevated cortisol levels. She had noticed weight gain and hair loss prior to the test. She states she feels she has always lost a lot of hair. No patches of full hair loss. She states her energy is typically low. She normally uses coffee occasionally for this. No pop  She has lost 3 pounds since last visit, has not been trying  She does not exercise outside of work. Works as a  so is on her feet regularly at work. Patient reports she stopped her abilify and celexa earlier this year and just restarted it 2 weeks ago. She finds she is tearful and feeling down often. No suicidal thoughts. She is not presently seeing a counselor. She reports has family history of psychiatric disease in her father who committed suicide. She thinks he may have been bipolar but he never sought out any treatment.     Hemoglobin A1C (%)   Date Value   03/30/2019 5.3   10/12/2015 4.9             ( goal A1Cis < 7)   No results found for: LABMICR  LDL Cholesterol (mg/dL)   Date Value   03/30/2019 69       (goal LDL is <100)   AST (U/L)   Date Value   03/29/2021 24     ALT (U/L)   Date Value   03/29/2021 23     BUN (mg/dL)   Date Value   03/29/2021 12     BP Readings from Last 3 Encounters:   10/18/21 124/74   03/30/21 136/84   03/01/21 124/74          (goal 120/80)    Past Medical History:   Diagnosis Date    Anxiety     Asthma     AS A CHILD    Depression     Gestational diabetes mellitus       Past Surgical History:   Procedure Laterality Date     SECTION         Family History   Problem Relation Age of Onset   [facility] Stroke Mother     Mental Retardation Paternal Uncle     Other Sister         alcohol addiction    No Known Problems Other        Social History     Tobacco Use    Smoking status: Former Smoker    Smokeless tobacco: Never Used    Tobacco comment: occasional only   Substance Use Topics    Alcohol use: Yes     Alcohol/week: 0.0 standard drinks     Comment: rare      Current Outpatient Medications   Medication Sig Dispense Refill    dexamethasone (DECADRON) 1 MG tablet Take 1 tablet at 11 pm the night before planned test. Test at lab the next morning at 8AM 1 tablet 0    citalopram (CELEXA) 40 MG tablet Take 1 tablet by mouth daily 30 tablet 3    ARIPiprazole (ABILIFY) 15 MG tablet Take 1 tablet by mouth nightly 30 tablet 3    norethindrone-ethinyl estradiol (LOESTRIN FE ) 1-20 MG-MCG per tablet Take 1 tablet by mouth daily 1 packet 11     No current facility-administered medications for this visit. No Known Allergies    Health Maintenance   Topic Date Due    Hepatitis C screen  Never done    Varicella vaccine (1 of 2 - 2-dose childhood series) Never done    Pneumococcal 0-64 years Vaccine (1 of 2 - PPSV23) Never done    COVID-19 Vaccine (1) Never done    Flu vaccine (1) 2021    Diabetes screen  2021    Cervical cancer screen  2024    DTaP/Tdap/Td vaccine (2 - Td or Tdap) 2024    HIV screen  Completed    Hepatitis A vaccine  Aged Out    Hepatitis B vaccine  Aged Out    Hib vaccine  Aged Out    Meningococcal (ACWY) vaccine  Aged Out       Subjective:     Review of Systems   Constitutional: Positive for fatigue and unexpected weight change. Negative for activity change, appetite change and fever.         /74 (Site: Left Upper Arm, Position: Sitting, Cuff Size: Large Adult)   Pulse 89   Temp 97.1 °F (36.2 °C) (Tympanic)   Ht 5' 1\" (1.549 m) Wt 177 lb (80.3 kg)   SpO2 97%   BMI 33.44 kg/m²    Respiratory: Negative for cough and shortness of breath. Cardiovascular: Negative for chest pain. Skin: Negative for color change, pallor, rash and wound. Neurological: Negative for weakness. Psychiatric/Behavioral: Negative for agitation, behavioral problems, confusion, decreased concentration, self-injury, sleep disturbance and suicidal ideas. The patient is nervous/anxious (depression). Objective:     Physical Exam  Vitals and nursing note reviewed. Constitutional:       General: She is not in acute distress. Appearance: Normal appearance. She is well-developed. She is not ill-appearing. HENT:      Head: Normocephalic and atraumatic. Skin:     General: Skin is warm and dry. Coloration: Skin is not pale. Findings: No erythema or rash. Comments: Generalized hair thinning present on scalp. No areas of total loss   Neurological:      Mental Status: She is alert and oriented to person, place, and time. Gait: Gait normal.   Psychiatric:         Attention and Perception: Attention and perception normal.         Mood and Affect: Mood is depressed. Affect is tearful. Speech: Speech normal.         Behavior: Behavior normal.         Thought Content: Thought content normal.         Judgment: Judgment normal.       /74 (Site: Left Upper Arm, Position: Sitting, Cuff Size: Large Adult)   Pulse 89   Temp 97.1 °F (36.2 °C) (Tympanic)   Ht 5' 1\" (1.549 m)   Wt 177 lb (80.3 kg)   SpO2 97%   BMI 33.44 kg/m²     Assessment:       Diagnosis Orders   1. Severe episode of recurrent major depressive disorder, without psychotic features ([facility] [address]     2. Weight gain  DEXAMETHASONE    Cortisol Total    dexamethasone (DECADRON) 1 MG tablet   3. Fatigue, unspecified type  DEXAMETHASONE    Cortisol Total    dexamethasone (DECADRON) 1 MG tablet   4.  Hair loss  DEXAMETHASONE    Cortisol Total    dexamethasone (DECADRON) 1 MG Instructedto continue current medications, diet and exercise. Patient agreed with treatmentplan. Follow up as directed.      Electronically signed by Ehsan Jack PA-C on 10/18/2021 at 12:36 PM

## 2021-10-18 NOTE — PROGRESS NOTES
Visit Information    Have you changed or started any medications since your last visit including any over-the-counter medicines, vitamins, or herbal medicines? no   Are you having any side effects from any of your medications? -  no  Have you stopped taking any of your medications? Is so, why? -  no    Have you seen any other physician or provider since your last visit? No  Have you had any other diagnostic tests since your last visit? No  Have you been seen in the emergency room and/or had an admission to a hospital since we last saw you? No  Have you had your routine dental cleaning in the past 6 months? no    Have you activated your [employer] account? If not, what are your barriers?  Yes     Patient Care Team:  Anatoly Lou PA-C as PCP - General (Physician Assistant)  Patito Crump MD as Obstetrician (Perinatology)    Medical History Review  Past Medical, Family, and Social History reviewed and  contribute to the patient presenting condition    Health Maintenance   Topic Date Due    Hepatitis C screen  Never done    Varicella vaccine (1 of 2 - 2-dose childhood series) Never done    Pneumococcal 0-64 years Vaccine (1 of 2 - PPSV23) Never done    COVID-19 Vaccine (1) Never done    Flu vaccine (1) 09/01/2021    Diabetes screen  12/14/2021    Cervical cancer screen  01/22/2024    DTaP/Tdap/Td vaccine (2 - Td or Tdap) 04/17/2024    HIV screen  Completed    Hepatitis A vaccine  Aged Out    Hepatitis B vaccine  Aged Out    Hib vaccine  Aged Out    Meningococcal (ACWY) vaccine  Aged Out

## 2021-11-15 ENCOUNTER — HOSPITAL ENCOUNTER (OUTPATIENT)
Age: 41
Setting detail: SPECIMEN
Discharge: HOME | End: 2021-11-15

## 2021-11-15 DIAGNOSIS — L65.9 HAIR LOSS: ICD-10-CM

## 2021-11-15 DIAGNOSIS — R63.5 WEIGHT GAIN: ICD-10-CM

## 2021-11-15 DIAGNOSIS — R53.83 FATIGUE, UNSPECIFIED TYPE: ICD-10-CM

## 2021-11-15 LAB
CORTISOL COLLECTION INFO: ABNORMAL
CORTISOL: 1 UG/DL (ref 2.7–18.4)

## 2021-11-20 LAB — DEXAMETHASONE: 225.7 NG/DL

## 2022-01-31 ENCOUNTER — OFFICE VISIT (OUTPATIENT)
Dept: PRIMARY CARE CLINIC | Age: 42
End: 2022-01-31
Payer: COMMERCIAL

## 2022-01-31 ENCOUNTER — HOSPITAL ENCOUNTER (OUTPATIENT)
Age: 42
Setting detail: SPECIMEN
Discharge: HOME | End: 2022-01-31

## 2022-01-31 VITALS
HEIGHT: 61 IN | BODY MASS INDEX: 33.42 KG/M2 | TEMPERATURE: 97 F | DIASTOLIC BLOOD PRESSURE: 88 MMHG | OXYGEN SATURATION: 98 % | SYSTOLIC BLOOD PRESSURE: 140 MMHG | HEART RATE: 84 BPM | WEIGHT: 177 LBS

## 2022-01-31 DIAGNOSIS — J06.9 VIRAL URI: Primary | ICD-10-CM

## 2022-01-31 PROCEDURE — 99213 OFFICE O/P EST LOW 20 MIN: CPT | Performed by: NURSE PRACTITIONER

## 2022-01-31 NOTE — LETTER
[facility]  [address] [address]  Phone: [phone]  Fax: [phone]    CELINA Machado CNP        January 31, 2022     Patient: Jon Moseley   YOB: 1980   Date of Visit: 1/31/2022       To Whom it May Concern:    Jon Moseley was seen in my clinic on 1/31/2022. She may return to work on 2/1/2022 without restrictions. Please excuse her absence. If you have any questions or concerns, please don't hesitate to call.     Sincerely,         CELINA Machado CNP

## 2022-01-31 NOTE — PATIENT INSTRUCTIONS
Patient Education        Viral Respiratory Infection: Care Instructions  Your Care Instructions     Viruses are very small organisms. They grow in number after they enter your body. There are many types that cause different illnesses, such as colds and the mumps. The symptoms of a viral respiratory infection often start quickly. They include a fever, sore throat, and runny nose. You may also just not feel well. Or you may not want to eat much. Most viral respiratory infections are not serious. They usually get better with time and self-care. Antibiotics are not used to treat a viral infection. That's because antibiotics will not help cure a viral illness. In some cases, antiviral medicine can help your body fight a serious viral infection. Follow-up care is a key part of your treatment and safety. Be sure to make and go to all appointments, and call your doctor if you are having problems. It's also a good idea to know your test results and keep a list of the medicines you take. How can you care for yourself at home? · Rest as much as possible until you feel better. · Be safe with medicines. Take your medicine exactly as prescribed. Call your doctor if you think you are having a problem with your medicine. You will get more details on the specific medicine your doctor prescribes. · Take an over-the-counter pain medicine, such as acetaminophen (Tylenol), ibuprofen (Advil, Motrin), or naproxen (Aleve), as needed for pain and fever. Read and follow all instructions on the label. Do not give aspirin to anyone younger than 20. It has been linked to Reye syndrome, a serious illness. · Drink plenty of fluids. Hot fluids, such as tea or soup, may help relieve congestion in your nose and throat. If you have kidney, heart, or liver disease and have to limit fluids, talk with your doctor before you increase the amount of fluids you drink.   · Try to clear mucus from your lungs by breathing deeply and coughing. · Gargle with warm salt water once an hour. This can help reduce swelling and throat pain. Use 1 teaspoon of salt mixed in 1 cup of warm water. · Do not smoke or allow others to smoke around you. If you need help quitting, talk to your doctor about stop-smoking programs and medicines. These can increase your chances of quitting for good. To avoid spreading the virus  · Cough or sneeze into a tissue. Then throw the tissue away. · If you don't have a tissue, use your hand to cover your cough or sneeze. Then clean your hand. You can also cough into your sleeve. · Wash your hands often. Use soap and warm water. Wash for 15 to 20 seconds each time. · If you don't have soap and water near you, you can clean your hands with alcohol wipes or gel. When should you call for help? Call your doctor now or seek immediate medical care if:    · You have a new or higher fever.     · Your fever lasts more than 48 hours.     · You have trouble breathing.     · You have a fever with a stiff neck or a severe headache.     · You are sensitive to light.     · You feel very sleepy or confused. Watch closely for changes in your health, and be sure to contact your doctor if:    · You do not get better as expected. Where can you learn more? Go to https://DoctorC.Big Stage. org and sign in to your [employer] account. Enter G609 in the [facility] box to learn more about \"Viral Respiratory Infection: Care Instructions. \"     If you do not have an account, please click on the \"Sign Up Now\" link. Current as of: July 6, 2021               Content Version: 13.1  © 4828-1241 Healthwise, [employer]. Care instructions adapted under license by [facility] ([facility]). If you have questions about a medical condition or this instruction, always ask your healthcare professional. [address] any warranty or liability for your use of this information.

## 2022-01-31 NOTE — PROGRESS NOTES
[address] WALK IN CARE  [address] [address]  Dept: [phone]  Dept Fax: [phone]     Maribel Owusu is a 37 y.o. female who presents to the urgent care today for her medicalconditions/complaints as noted below. Maribel Owusu is c/o of Covid Testing (pt has been having congestion cough sore throat drainage X 5 days  )    HPI:      Sinusitis  This is a new problem. The current episode started in the past 7 days. The problem is unchanged. There has been no fever. Associated symptoms include congestion, coughing and a sore throat. Pertinent negatives include no chills, ear pain, headaches, shortness of breath, sinus pressure or sneezing. Treatments tried: otc tx. The treatment provided no relief. Past Medical History:   Diagnosis Date    Anxiety     Asthma     AS A CHILD    Depression     Gestational diabetes mellitus        Current Outpatient Medications   Medication Sig Dispense Refill    dexamethasone (DECADRON) 1 MG tablet Take 1 tablet at 11 pm the night before planned test. Test at lab the next morning at 8AM 1 tablet 0    citalopram (CELEXA) 40 MG tablet Take 1 tablet by mouth daily 30 tablet 3    ARIPiprazole (ABILIFY) 15 MG tablet Take 1 tablet by mouth nightly 30 tablet 3    norethindrone-ethinyl estradiol (LOESTRIN FE 1/20) 1-20 MG-MCG per tablet Take 1 tablet by mouth daily 1 packet 11     No current facility-administered medications for this visit. No Known Allergies    Reviewed PMH, SH, and FH with the patient and updated. Subjective:      Review of Systems   Constitutional: Negative for chills, fatigue and fever. HENT: Positive for congestion and sore throat. Negative for ear discharge, ear pain, postnasal drip, sinus pressure, sneezing and voice change. Eyes: Negative for discharge and redness. Respiratory: Positive for cough.  Negative for chest tightness, shortness of breath and wheezing. Cardiovascular: Negative. Negative for chest pain. Musculoskeletal: Negative for myalgias. Skin: Negative for rash. Neurological: Negative for dizziness, weakness, light-headedness and headaches. Hematological: Negative for adenopathy. All other systems reviewed and are negative. Objective:      Physical Exam  Vitals and nursing note reviewed. Constitutional:       General: She is not in acute distress. Appearance: Normal appearance. She is well-developed. She is not ill-appearing, toxic-appearing or diaphoretic. HENT:      Head: Normocephalic. Right Ear: Tympanic membrane and external ear normal.      Left Ear: Tympanic membrane and external ear normal.      Nose: Nose normal.      Right Sinus: No maxillary sinus tenderness or frontal sinus tenderness. Left Sinus: No maxillary sinus tenderness or frontal sinus tenderness. Mouth/Throat:      Pharynx: Oropharyngeal exudate (PND) present. No posterior oropharyngeal erythema. Eyes:      General:         Right eye: No discharge. Left eye: No discharge. Cardiovascular:      Rate and Rhythm: Normal rate and regular rhythm. Heart sounds: Normal heart sounds. No murmur heard. Pulmonary:      Effort: Pulmonary effort is normal. No respiratory distress. Breath sounds: Normal breath sounds. No wheezing or rales. Lymphadenopathy:      Cervical: No cervical adenopathy. Skin:     General: Skin is warm. Findings: No rash. Neurological:      Mental Status: She is alert. BP (!) 140/88 (Site: Left Upper Arm, Position: Sitting, Cuff Size: Large Adult)   Pulse 84   Temp 97 °F (36.1 °C) (Tympanic)   Ht 5' 1\" (1.549 m)   Wt 177 lb (80.3 kg)   SpO2 98%   Breastfeeding No   BMI 33.44 kg/m²     Assessment:       Diagnosis Orders   1. Viral URI  COVID-19     Plan: Will send out COVID19 testing. Possible treatment alterations based on the results.   Patient instructed to self-quarantine until testing results are back. Patient instructed not to return to work until results are back. Tylenol as needed for fever/pain. Encouraged adequate hydration and rest.  The patient indicates understanding of these issues and agrees with the plan. Educational materials provided on AVS.  Follow up if symptoms do not improve/worsen. Discussed symptoms that will warrant urgent ED evaluation/treatment. Patient given educational materials - see patient instructions. Discussed use, benefit, and side effects of prescribed medications. All patientquestions answered. Pt voiced understanding.     Electronically signed by CELINA Cobb CNP on 2/1/2022at 8:30 AM

## 2022-02-01 DIAGNOSIS — J06.9 VIRAL URI: ICD-10-CM

## 2022-02-01 LAB
SARS-COV-2: ABNORMAL
SARS-COV-2: DETECTED
SOURCE: ABNORMAL

## 2022-02-01 ASSESSMENT — ENCOUNTER SYMPTOMS
CHEST TIGHTNESS: 0
EYE REDNESS: 0
WHEEZING: 0
SINUS PRESSURE: 0
VOICE CHANGE: 0
SHORTNESS OF BREATH: 0
EYE DISCHARGE: 0
SORE THROAT: 1
COUGH: 1

## 2022-03-07 RX ORDER — CITALOPRAM 40 MG/1
TABLET ORAL
Qty: 30 TABLET | Refills: 3 | Status: SHIPPED | OUTPATIENT
Start: 2022-03-07 | End: 2022-08-14

## 2022-03-07 RX ORDER — ARIPIPRAZOLE 15 MG/1
TABLET ORAL
Qty: 30 TABLET | Refills: 3 | Status: SHIPPED | OUTPATIENT
Start: 2022-03-07 | End: 2022-08-14

## 2022-04-05 ENCOUNTER — TELEPHONE (OUTPATIENT)
Dept: FAMILY MEDICINE CLINIC | Age: 42
End: 2022-04-05

## 2022-04-05 NOTE — TELEPHONE ENCOUNTER
----- Message from Molly Villafana sent at 4/5/2022 12:57 PM EDT -----  Subject: Appointment Request    Reason for Call: Semi-Routine Skin Problem    QUESTIONS  Type of Appointment? Established Patient  Reason for appointment request? No appointments available during search  Additional Information for Provider? Pt would like to see Dr Rachana Shields only   for a lump on R elbow-getting larger; screened green  ---------------------------------------------------------------------------  --------------  CALL BACK INFO  What is the best way for the office to contact you? OK to leave message on   voicemail  Preferred Call Back Phone Number? [phone]  ---------------------------------------------------------------------------  --------------  SCRIPT ANSWERS  Relationship to Patient? Self  Are you having swelling in your throat or face? No  Are you having difficulty breathing? No  Have the symptoms worsened or spread in the last day? No  Are you having fevers (100.4), chills or sweats? No  Have you recently (14 days) seen a provider for this issue? No  Have you been diagnosed with, awaiting test results for, or told that you   are suspected of having COVID-19 (Coronavirus)? (If patient has tested   negative or was tested as a requirement for work, school, or travel and   not based on symptoms, answer no)? Yes  Did your symptoms begin within the past 10 days or was your positive test   result within the past 10 days? No  Within the past 10 days have you developed any of the following symptoms   (answer no if symptoms have been present longer than 10 days or began   more than 10 days ago)? Fever or Chills, Cough, Shortness of breath or   difficulty breathing, Loss of taste or smell, Sore throat, Nasal   congestion, Sneezing or runny nose, Fatigue or generalized body aches   (answer no if pain is specific to a body part e.g. back pain), Diarrhea,   Headache?  No  Have you had close contact with someone with COVID-19 in the last 7 days?   No  (Service Expert  click yes below to proceed with InComm As Usual   Scheduling)?  Yes

## 2022-04-19 ENCOUNTER — OFFICE VISIT (OUTPATIENT)
Dept: FAMILY MEDICINE CLINIC | Age: 42
End: 2022-04-19
Payer: COMMERCIAL

## 2022-04-19 VITALS
TEMPERATURE: 97.8 F | HEIGHT: 61 IN | BODY MASS INDEX: 35.68 KG/M2 | SYSTOLIC BLOOD PRESSURE: 126 MMHG | HEART RATE: 81 BPM | DIASTOLIC BLOOD PRESSURE: 66 MMHG | OXYGEN SATURATION: 99 % | WEIGHT: 189 LBS

## 2022-04-19 DIAGNOSIS — F41.9 ANXIETY: ICD-10-CM

## 2022-04-19 DIAGNOSIS — R63.5 WEIGHT GAIN: Primary | ICD-10-CM

## 2022-04-19 DIAGNOSIS — Z13.220 SCREENING, LIPID: ICD-10-CM

## 2022-04-19 DIAGNOSIS — F32.A DEPRESSION, UNSPECIFIED DEPRESSION TYPE: ICD-10-CM

## 2022-04-19 DIAGNOSIS — M79.89 MASS OF SOFT TISSUE OF ELBOW: ICD-10-CM

## 2022-04-19 PROCEDURE — 99214 OFFICE O/P EST MOD 30 MIN: CPT | Performed by: PHYSICIAN ASSISTANT

## 2022-04-19 RX ORDER — HYDROXYZINE PAMOATE 25 MG/1
25 CAPSULE ORAL DAILY PRN
Qty: 30 CAPSULE | Refills: 0 | Status: SHIPPED | OUTPATIENT
Start: 2022-04-19 | End: 2022-05-19

## 2022-04-19 SDOH — ECONOMIC STABILITY: FOOD INSECURITY: WITHIN THE PAST 12 MONTHS, THE FOOD YOU BOUGHT JUST DIDN'T LAST AND YOU DIDN'T HAVE MONEY TO GET MORE.: NEVER TRUE

## 2022-04-19 SDOH — ECONOMIC STABILITY: FOOD INSECURITY: WITHIN THE PAST 12 MONTHS, YOU WORRIED THAT YOUR FOOD WOULD RUN OUT BEFORE YOU GOT MONEY TO BUY MORE.: NEVER TRUE

## 2022-04-19 ASSESSMENT — PATIENT HEALTH QUESTIONNAIRE - PHQ9
SUM OF ALL RESPONSES TO PHQ9 QUESTIONS 1 & 2: 0
SUM OF ALL RESPONSES TO PHQ QUESTIONS 1-9: 0
2. FEELING DOWN, DEPRESSED OR HOPELESS: 0
SUM OF ALL RESPONSES TO PHQ QUESTIONS 1-9: 0
4. FEELING TIRED OR HAVING LITTLE ENERGY: 0
6. FEELING BAD ABOUT YOURSELF - OR THAT YOU ARE A FAILURE OR HAVE LET YOURSELF OR YOUR FAMILY DOWN: 0
10. IF YOU CHECKED OFF ANY PROBLEMS, HOW DIFFICULT HAVE THESE PROBLEMS MADE IT FOR YOU TO DO YOUR WORK, TAKE CARE OF THINGS AT HOME, OR GET ALONG WITH OTHER PEOPLE: 0
1. LITTLE INTEREST OR PLEASURE IN DOING THINGS: 0
SUM OF ALL RESPONSES TO PHQ QUESTIONS 1-9: 0
9. THOUGHTS THAT YOU WOULD BE BETTER OFF DEAD, OR OF HURTING YOURSELF: 0
3. TROUBLE FALLING OR STAYING ASLEEP: 0
7. TROUBLE CONCENTRATING ON THINGS, SUCH AS READING THE NEWSPAPER OR WATCHING TELEVISION: 0
5. POOR APPETITE OR OVEREATING: 0
8. MOVING OR SPEAKING SO SLOWLY THAT OTHER PEOPLE COULD HAVE NOTICED. OR THE OPPOSITE, BEING SO FIGETY OR RESTLESS THAT YOU HAVE BEEN MOVING AROUND A LOT MORE THAN USUAL: 0
SUM OF ALL RESPONSES TO PHQ QUESTIONS 1-9: 0

## 2022-04-19 ASSESSMENT — ENCOUNTER SYMPTOMS
COLOR CHANGE: 0
WHEEZING: 0
SHORTNESS OF BREATH: 0
ABDOMINAL PAIN: 0
COUGH: 0

## 2022-04-19 ASSESSMENT — SOCIAL DETERMINANTS OF HEALTH (SDOH): HOW HARD IS IT FOR YOU TO PAY FOR THE VERY BASICS LIKE FOOD, HOUSING, MEDICAL CARE, AND HEATING?: NOT HARD AT ALL

## 2022-04-19 NOTE — PROGRESS NOTES
Visit Information    Have you changed or started any medications since your last visit including any over-the-counter medicines, vitamins, or herbal medicines? no   Are you having any side effects from any of your medications? -  no  Have you stopped taking any of your medications? Is so, why? -  no    Have you seen any other physician or provider since your last visit? No  Have you had any other diagnostic tests since your last visit? No  Have you been seen in the emergency room and/or had an admission to a hospital since we last saw you? No  Have you had your routine dental cleaning in the past 6 months? no    Have you activated your [employer] account? If not, what are your barriers?  Yes     Patient Care Team:  Fernanda Schultz PA-C as PCP - General (Physician Assistant)  Fernanda Schultz PA-C as PCP - [facility] Provider  Qing Ramos MD as Obstetrician (Perinatology)    Medical History Review  Past Medical, Family, and Social History reviewed and  contribute to the patient presenting condition    Health Maintenance   Topic Date Due    Hepatitis C screen  Never done    Varicella vaccine (1 of 2 - 2-dose childhood series) Never done    COVID-19 Vaccine (1) Never done    Pneumococcal 0-64 years Vaccine (1 - PCV) Never done    Depression Monitoring  03/30/2022    Diabetes screen  03/30/2022    Flu vaccine (Season Ended) 09/01/2022    Cervical cancer screen  01/22/2024    Lipid screen  03/30/2024    DTaP/Tdap/Td vaccine (2 - Td or Tdap) 04/17/2024    HIV screen  Completed    Hepatitis A vaccine  Aged Out    Hepatitis B vaccine  Aged Out    Hib vaccine  Aged Out    Meningococcal (ACWY) vaccine  Aged Out

## 2022-04-25 RX ORDER — NORETHINDRONE ACETATE AND ETHINYL ESTRADIOL 1MG-20(21)
1 KIT ORAL DAILY
Qty: 1 PACKET | Refills: 11 | Status: SHIPPED | OUTPATIENT
Start: 2022-04-25

## 2022-08-14 RX ORDER — ARIPIPRAZOLE 15 MG/1
TABLET ORAL
Qty: 30 TABLET | Refills: 3 | Status: SHIPPED | OUTPATIENT
Start: 2022-08-14

## 2022-08-14 RX ORDER — CITALOPRAM 40 MG/1
TABLET ORAL
Qty: 30 TABLET | Refills: 3 | Status: SHIPPED | OUTPATIENT
Start: 2022-08-14

## 2022-12-26 RX ORDER — ARIPIPRAZOLE 15 MG/1
TABLET ORAL
Qty: 30 TABLET | Refills: 3 | Status: SHIPPED | OUTPATIENT
Start: 2022-12-26

## 2022-12-26 RX ORDER — CITALOPRAM 40 MG/1
TABLET ORAL
Qty: 30 TABLET | Refills: 3 | OUTPATIENT
Start: 2022-12-26

## 2023-01-27 RX ORDER — CITALOPRAM 40 MG/1
TABLET ORAL
Qty: 30 TABLET | Refills: 3 | Status: SHIPPED | OUTPATIENT
Start: 2023-01-27

## 2023-06-12 RX ORDER — CITALOPRAM 40 MG/1
TABLET ORAL
Qty: 30 TABLET | Refills: 0 | Status: SHIPPED | OUTPATIENT
Start: 2023-06-12

## 2024-09-25 NOTE — PROGRESS NOTES
[id number] [facility]  [id number]  [facility] [address] [address]  Dept: [phone]  Dept Fax: [phone]    Andrew Herrera is a 37 y.o. female who presents today for her medical conditions/complaintsas noted below. Andrew Herrera is c/o of   Chief Complaint   Patient presents with    Anxiety    Depression    Mass     on right elbow- been there for years- growing in size    Weight Gain     discuss options         HPI:     HPI    Patient here for recheck with her anxiety/depression. She is doing well on her present medications. She has been noticing that she has some breakthrough anxiety intermittently. It is not happening daily but often does happen before she leaves for work. She is interested in seeing if as needed medication could be used to help with this. She also updates that she feels she has been gaining weight. She does not presently exercise at all however does work as a   In the evenings and is very active there. She does this 5 hours 5 times a week. She works late into the evening and tends to come home by midnight, eat a meal and go to bed. She then wakes her children up for school and goes back to bed until shortly after noon. She eats another meal before she leaves and goes back to work. She states she has not been careful with her diet, eats what is easy. She does drink water well, mostly sugar-free sparkling water. She has gained 12 pounds since January    She also reports she has had a mass on her right elbow for many years. Over the last year she has noticed a growing in size and is becoming quite large.   She would like to have this removed    Hemoglobin A1C (%)   Date Value   03/30/2019 5.3   10/12/2015 4.9             ( goal A1Cis < 7)   No results found for: LABMICR  LDL Cholesterol (mg/dL)   Date Value   03/30/2019 69       (goal LDL is <100)   AST (U/L)   Date Value   03/29/2021 24     ALT (U/L)   Date Value Take mucinex to help with the cough    Drink at least 64oz water a day   2021 23     BUN (mg/dL)   Date Value   2021 12     BP Readings from Last 3 Encounters:   22 126/66   22 (!) 140/88   10/18/21 124/74          (goal 120/80)    Past Medical History:   Diagnosis Date    Anxiety     Asthma     AS A CHILD    Depression     Gestational diabetes mellitus       Past Surgical History:   Procedure Laterality Date     SECTION         Family History   Problem Relation Age of Onset    Stroke Mother     Mental Retardation Paternal Uncle     Other Sister         alcohol addiction    No Known Problems Other        Social History     Tobacco Use    Smoking status: Former Smoker    Smokeless tobacco: Never Used    Tobacco comment: occasional only   Substance Use Topics    Alcohol use: Yes     Alcohol/week: 0.0 standard drinks     Comment: rare      Current Outpatient Medications   Medication Sig Dispense Refill    hydrOXYzine (VISTARIL) 25 MG capsule Take 1 capsule by mouth daily as needed for Anxiety 30 capsule 0    citalopram (CELEXA) 40 MG tablet TAKE ONE TABLET BY MOUTH DAILY 30 tablet 3    ARIPiprazole (ABILIFY) 15 MG tablet TAKE ONE TABLET BY MOUTH ONCE NIGHTLY 30 tablet 3    norethindrone-ethinyl estradiol (LOESTRIN FE ) 1-20 MG-MCG per tablet Take 1 tablet by mouth daily 1 packet 11     No current facility-administered medications for this visit.      No Known Allergies    Health Maintenance   Topic Date Due    Hepatitis C screen  Never done    Varicella vaccine (1 of 2 - 2-dose childhood series) Never done    Pneumococcal 0-64 years Vaccine (1 - PCV) Never done    Depression Monitoring  2022    Diabetes screen  2022    COVID-19 Vaccine (1) 2023 (Originally 1986)    Flu vaccine (Season Ended) 2022    Cervical cancer screen  2024    Lipid screen  2024    DTaP/Tdap/Td vaccine (2 - Td or Tdap) 2024    HIV screen  Completed    Hepatitis A vaccine  Aged Out    Hepatitis B vaccine Aged Out    Hib vaccine  Aged Out    Meningococcal (ACWY) vaccine  Aged Out       Subjective:     Review of Systems   Constitutional: Positive for unexpected weight change. Negative for activity change, appetite change, fatigue and fever. /66 (Site: Left Upper Arm, Position: Sitting, Cuff Size: Large Adult)   Pulse 81   Temp 97.8 °F (36.6 °C) (Tympanic)   Ht 5' 1\" (1.549 m)   Wt 189 lb (85.7 kg)   SpO2 99%   BMI 35.71 kg/m²    Respiratory: Negative for cough, shortness of breath and wheezing. Cardiovascular: Negative for chest pain and palpitations. Gastrointestinal: Negative for abdominal pain. Musculoskeletal: Negative for arthralgias. Skin: Negative for color change and pallor. Hematological: Negative for adenopathy. Psychiatric/Behavioral: Negative for agitation, behavioral problems, confusion, decreased concentration, sleep disturbance and suicidal ideas. The patient is nervous/anxious. Objective:     Physical Exam  Vitals and nursing note reviewed. Constitutional:       General: She is not in acute distress. Appearance: Normal appearance. She is well-developed. She is obese. She is not ill-appearing. HENT:      Head: Normocephalic and atraumatic. Cardiovascular:      Rate and Rhythm: Normal rate and regular rhythm. Heart sounds: No murmur heard. Pulmonary:      Effort: Pulmonary effort is normal. No respiratory distress. Breath sounds: Normal breath sounds. No wheezing, rhonchi or rales. Musculoskeletal:      Right elbow: Deformity present. No swelling, effusion or lacerations. Normal range of motion. No tenderness. Arms:       Comments: Palpable mass posterior and superior to the elbow joint. It is nontender with palpation. It moves easily. The border of the mass is easy to palpate. Skin:     General: Skin is warm and dry. Coloration: Skin is not pale. Findings: No erythema or rash.    Neurological:      Mental Status: She is alert and oriented to person, place, and time. Psychiatric:         Mood and Affect: Mood normal.         Behavior: Behavior normal.         Thought Content: Thought content normal.         Judgment: Judgment normal.      Comments: Pleasant, talkative       /66 (Site: Left Upper Arm, Position: Sitting, Cuff Size: Large Adult)   Pulse 81   Temp 97.8 °F (36.6 °C) (Tympanic)   Ht 5' 1\" (1.549 m)   Wt 189 lb (85.7 kg)   SpO2 99%   BMI 35.71 kg/m²     Assessment:       Diagnosis Orders   1. Weight gain     2. Anxiety  hydrOXYzine (VISTARIL) 25 MG capsule    Comprehensive Metabolic Panel    CBC with Auto Differential    TSH   3. Depression, unspecified depression type     4. Mass of soft tissue of elbow  Amb External Referral To General Surgery   5. Screening, lipid  Lipid Panel             Plan:      Return in about 6 months (around 10/19/2022) for med review. Patient and I discussed the need for low-fat diet and regular exercise. I encouraged her to work up to tolerating 150 minutes of cardiac activity weekly. This is in addition to her typical work schedule. We also discussed avoiding late night eating. I offered a referral to a nutritionist to discuss this balance better, patient declined for now. She has been doing well on her antidepressant medication, however is having breakthrough anxiety. We discussed the use and side effect of hydroxyzine. She is interested in trying this. I sent this to the pharmacy for her  We will go ahead and update labs including TSH due to anxiety and weight gain, screening lipid also ordered. Await results  I suspect the mass on her elbow is a lipoma. She is interested in having this off which I agree with as it has been growing. I recommended ultrasound image first, patient declined for now. She did take a referral to general surgery, and will call for an appointment.   I gave her the paperwork with the phone number on this  Plan to recheck in 6 months, sooner as needed  Patient agreed with treatment plan    Orders Placed This Encounter   Procedures    Comprehensive Metabolic Panel     Standing Status:   Future     Standing Expiration Date:   4/19/2023    CBC with Auto Differential     Standing Status:   Future     Standing Expiration Date:   4/19/2023    TSH     Standing Status:   Future     Standing Expiration Date:   4/19/2023    Lipid Panel     Standing Status:   Future     Standing Expiration Date:   4/19/2023     Order Specific Question:   Is Patient Fasting?/# of Hours     Answer:   yes    Amb External Referral To General Surgery     Referral Priority:   Routine     Referral Type:   Consult for Advice and Opinion     Referral Reason:   Specialty Services Required     Referred to Provider:   Gregg Kamara     Requested Specialty:   General Surgery     Number of Visits Requested:   1     Orders Placed This Encounter   Medications    hydrOXYzine (VISTARIL) 25 MG capsule     Sig: Take 1 capsule by mouth daily as needed for Anxiety     Dispense:  30 capsule     Refill:  0       Patient given educational materials - see patient instructions. Discussed use, benefit, and side effects of prescribed medications. All patientquestions answered. Pt voiced understanding. Reviewed health maintenance. Instructedto continue current medications, diet and exercise. Patient agreed with treatmentplan. Follow up as directed. Please note that this chart was generated using voice recognition Dragon dictation software. Although every effort was made to ensure the accuracy of this automated transcription, some errors in transcription may have occurred.      Electronically signed by Iftikhar Killian PA-C on 4/19/2022 at 11:13 AM